# Patient Record
Sex: MALE | Race: WHITE | Employment: FULL TIME | URBAN - NONMETROPOLITAN AREA
[De-identification: names, ages, dates, MRNs, and addresses within clinical notes are randomized per-mention and may not be internally consistent; named-entity substitution may affect disease eponyms.]

---

## 2020-08-08 ENCOUNTER — HOSPITAL ENCOUNTER (OUTPATIENT)
Age: 45
Discharge: HOME | End: 2020-08-08
Payer: COMMERCIAL

## 2020-08-08 LAB
ALBUMIN SERPL-MCNC: 4.2 G/DL (ref 3.5–5.1)
ALP BLD-CCNC: 62 U/L (ref 38–126)
ALT SERPL-CCNC: 67 U/L (ref 11–66)
AST SERPL-CCNC: 73 U/L (ref 5–40)
BASOPHILS # BLD: 0.3 %
BASOPHILS ABSOLUTE: 0 THOU/MM3 (ref 0–0.1)
BILIRUB SERPL-MCNC: 0.6 MG/DL (ref 0.3–1.2)
BILIRUBIN DIRECT: < 0.2 MG/DL (ref 0–0.3)
CHOLESTEROL, TOTAL: 206 MG/DL (ref 100–199)
EOSINOPHIL # BLD: 2.7 %
EOSINOPHILS ABSOLUTE: 0.2 THOU/MM3 (ref 0–0.4)
ERYTHROCYTE [DISTWIDTH] IN BLOOD BY AUTOMATED COUNT: 13.4 % (ref 11.5–14.5)
ERYTHROCYTE [DISTWIDTH] IN BLOOD BY AUTOMATED COUNT: 43.9 FL (ref 35–45)
HCT VFR BLD CALC: 43.5 % (ref 42–52)
HDLC SERPL-MCNC: 40 MG/DL
HEMOGLOBIN: 14.8 GM/DL (ref 14–18)
IMMATURE GRANS (ABS): 0.02 THOU/MM3 (ref 0–0.07)
IMMATURE GRANULOCYTES: 0.3 %
LDL CHOLESTEROL CALCULATED: 139 MG/DL
LYMPHOCYTES # BLD: 30.1 %
LYMPHOCYTES ABSOLUTE: 2.1 THOU/MM3 (ref 1–4.8)
MCH RBC QN AUTO: 30.6 PG (ref 26–33)
MCHC RBC AUTO-ENTMCNC: 34 GM/DL (ref 32.2–35.5)
MCV RBC AUTO: 89.9 FL (ref 80–94)
MONOCYTES # BLD: 9.2 %
MONOCYTES ABSOLUTE: 0.6 THOU/MM3 (ref 0.4–1.3)
NUCLEATED RED BLOOD CELLS: 0 /100 WBC
PLATELET # BLD: 184 THOU/MM3 (ref 130–400)
PMV BLD AUTO: 12.3 FL (ref 9.4–12.4)
RBC # BLD: 4.84 MILL/MM3 (ref 4.7–6.1)
SEG NEUTROPHILS: 57.4 %
SEGMENTED NEUTROPHILS ABSOLUTE COUNT: 4 THOU/MM3 (ref 1.8–7.7)
T4 FREE: 1.05 NG/DL (ref 0.93–1.76)
TOTAL PROTEIN: 6.9 G/DL (ref 6.1–8)
TRIGL SERPL-MCNC: 137 MG/DL (ref 0–199)
TSH SERPL DL<=0.05 MIU/L-ACNC: 0.94 UIU/ML (ref 0.4–4.2)
WBC # BLD: 6.9 THOU/MM3 (ref 4.8–10.8)

## 2020-08-08 PROCEDURE — 36415 COLL VENOUS BLD VENIPUNCTURE: CPT

## 2020-08-08 PROCEDURE — 80076 HEPATIC FUNCTION PANEL: CPT

## 2020-08-08 PROCEDURE — 84439 ASSAY OF FREE THYROXINE: CPT

## 2020-08-08 PROCEDURE — 80061 LIPID PANEL: CPT

## 2020-08-08 PROCEDURE — 84443 ASSAY THYROID STIM HORMONE: CPT

## 2020-08-08 PROCEDURE — 85025 COMPLETE CBC W/AUTO DIFF WBC: CPT

## 2021-10-12 ENCOUNTER — HOSPITAL ENCOUNTER (OUTPATIENT)
Age: 46
Setting detail: SPECIMEN
Discharge: HOME | End: 2021-10-12

## 2021-10-12 PROCEDURE — U0005 INFEC AGEN DETEC AMPLI PROBE: HCPCS

## 2021-10-12 PROCEDURE — U0003 INFECTIOUS AGENT DETECTION BY NUCLEIC ACID (DNA OR RNA); SEVERE ACUTE RESPIRATORY SYNDROME CORONAVIRUS 2 (SARS-COV-2) (CORONAVIRUS DISEASE [COVID-19]), AMPLIFIED PROBE TECHNIQUE, MAKING USE OF HIGH THROUGHPUT TECHNOLOGIES AS DESCRIBED BY CMS-2020-01-R: HCPCS

## 2021-10-14 LAB
SARS-COV-2: DETECTED
SOURCE: ABNORMAL

## 2023-06-29 ENCOUNTER — OFFICE VISIT (OUTPATIENT)
Dept: FAMILY MEDICINE CLINIC | Age: 48
End: 2023-06-29
Payer: COMMERCIAL

## 2023-06-29 VITALS
WEIGHT: 281.2 LBS | TEMPERATURE: 98 F | SYSTOLIC BLOOD PRESSURE: 130 MMHG | HEIGHT: 70 IN | BODY MASS INDEX: 40.26 KG/M2 | HEART RATE: 90 BPM | DIASTOLIC BLOOD PRESSURE: 70 MMHG | RESPIRATION RATE: 18 BRPM | OXYGEN SATURATION: 97 %

## 2023-06-29 DIAGNOSIS — I10 PRIMARY HYPERTENSION: ICD-10-CM

## 2023-06-29 DIAGNOSIS — Z76.89 ENCOUNTER TO ESTABLISH CARE: Primary | ICD-10-CM

## 2023-06-29 DIAGNOSIS — Z13.220 SCREENING, LIPID: ICD-10-CM

## 2023-06-29 DIAGNOSIS — K21.9 GASTROESOPHAGEAL REFLUX DISEASE WITHOUT ESOPHAGITIS: ICD-10-CM

## 2023-06-29 DIAGNOSIS — R74.8 LIVER ENZYME ELEVATION: ICD-10-CM

## 2023-06-29 DIAGNOSIS — Z00.00 HEALTHCARE MAINTENANCE: ICD-10-CM

## 2023-06-29 DIAGNOSIS — Z13.1 SCREENING FOR DIABETES MELLITUS (DM): ICD-10-CM

## 2023-06-29 PROCEDURE — 99203 OFFICE O/P NEW LOW 30 MIN: CPT | Performed by: FAMILY MEDICINE

## 2023-06-29 PROCEDURE — 3078F DIAST BP <80 MM HG: CPT | Performed by: FAMILY MEDICINE

## 2023-06-29 PROCEDURE — 3075F SYST BP GE 130 - 139MM HG: CPT | Performed by: FAMILY MEDICINE

## 2023-06-29 RX ORDER — LOSARTAN POTASSIUM 25 MG/1
TABLET ORAL DAILY
COMMUNITY
End: 2023-06-29 | Stop reason: SDUPTHER

## 2023-06-29 RX ORDER — LOSARTAN POTASSIUM 25 MG/1
25 TABLET ORAL DAILY
Qty: 90 TABLET | Refills: 3 | Status: SHIPPED | OUTPATIENT
Start: 2023-06-29

## 2023-06-29 SDOH — ECONOMIC STABILITY: HOUSING INSECURITY
IN THE LAST 12 MONTHS, WAS THERE A TIME WHEN YOU DID NOT HAVE A STEADY PLACE TO SLEEP OR SLEPT IN A SHELTER (INCLUDING NOW)?: NO

## 2023-06-29 SDOH — ECONOMIC STABILITY: INCOME INSECURITY: HOW HARD IS IT FOR YOU TO PAY FOR THE VERY BASICS LIKE FOOD, HOUSING, MEDICAL CARE, AND HEATING?: NOT HARD AT ALL

## 2023-06-29 SDOH — ECONOMIC STABILITY: FOOD INSECURITY: WITHIN THE PAST 12 MONTHS, YOU WORRIED THAT YOUR FOOD WOULD RUN OUT BEFORE YOU GOT MONEY TO BUY MORE.: NEVER TRUE

## 2023-06-29 SDOH — ECONOMIC STABILITY: FOOD INSECURITY: WITHIN THE PAST 12 MONTHS, THE FOOD YOU BOUGHT JUST DIDN'T LAST AND YOU DIDN'T HAVE MONEY TO GET MORE.: NEVER TRUE

## 2023-06-29 ASSESSMENT — ENCOUNTER SYMPTOMS
NAUSEA: 1
CONSTIPATION: 0
VOMITING: 0
DIARRHEA: 0
BLOOD IN STOOL: 0
SHORTNESS OF BREATH: 0
COUGH: 1

## 2023-06-29 ASSESSMENT — PATIENT HEALTH QUESTIONNAIRE - PHQ9
SUM OF ALL RESPONSES TO PHQ QUESTIONS 1-9: 0
SUM OF ALL RESPONSES TO PHQ9 QUESTIONS 1 & 2: 0
SUM OF ALL RESPONSES TO PHQ QUESTIONS 1-9: 0
1. LITTLE INTEREST OR PLEASURE IN DOING THINGS: 0
SUM OF ALL RESPONSES TO PHQ QUESTIONS 1-9: 0
2. FEELING DOWN, DEPRESSED OR HOPELESS: 0
SUM OF ALL RESPONSES TO PHQ QUESTIONS 1-9: 0

## 2023-07-31 ENCOUNTER — TELEPHONE (OUTPATIENT)
Dept: FAMILY MEDICINE CLINIC | Age: 48
End: 2023-07-31

## 2023-07-31 NOTE — TELEPHONE ENCOUNTER
Records were made available from the previous practice. -- Colonoscopy is noted to be done December/20 8/2020 with spasms noted along with mild internal hemorrhoids and diverticulosis. Please locate in records and add to health maintenance. -- please remind patient to obtain labs ordered. His liver enzymes were noted to be elevated in 2020 and follow up is advised. -- appendectomy with ileocectomy added to surgical history.

## 2023-08-04 NOTE — TELEPHONE ENCOUNTER
Patient stopped into office to follow up on letter. He states colonoscopy was done with Dr Mirtha Gonzales at [facility].

## 2023-09-11 ENCOUNTER — HOSPITAL ENCOUNTER (OUTPATIENT)
Age: 48
Discharge: HOME | End: 2023-09-11
Payer: COMMERCIAL

## 2023-09-11 DIAGNOSIS — Z13.1 SCREENING FOR DIABETES MELLITUS (DM): ICD-10-CM

## 2023-09-11 DIAGNOSIS — R74.8 LIVER ENZYME ELEVATION: ICD-10-CM

## 2023-09-11 DIAGNOSIS — I10 PRIMARY HYPERTENSION: ICD-10-CM

## 2023-09-11 DIAGNOSIS — Z13.220 SCREENING, LIPID: ICD-10-CM

## 2023-09-11 DIAGNOSIS — Z00.00 HEALTHCARE MAINTENANCE: ICD-10-CM

## 2023-09-11 LAB
BASOPHILS ABSOLUTE: 0 THOU/MM3 (ref 0–0.1)
BASOPHILS NFR BLD AUTO: 0.5 %
DEPRECATED RDW RBC AUTO: 40.9 FL (ref 35–45)
EOSINOPHIL NFR BLD AUTO: 2.4 %
EOSINOPHILS ABSOLUTE: 0.2 THOU/MM3 (ref 0–0.4)
ERYTHROCYTE [DISTWIDTH] IN BLOOD BY AUTOMATED COUNT: 13.1 % (ref 11.5–14.5)
HCT VFR BLD AUTO: 45 % (ref 42–52)
HGB BLD-MCNC: 14.8 GM/DL (ref 14–18)
IMM GRANULOCYTES # BLD AUTO: 0.03 THOU/MM3 (ref 0–0.07)
IMM GRANULOCYTES NFR BLD AUTO: 0.3 %
LYMPHOCYTES ABSOLUTE: 3 THOU/MM3 (ref 1–4.8)
LYMPHOCYTES NFR BLD AUTO: 34.4 %
MCH RBC QN AUTO: 28.4 PG (ref 26–33)
MCHC RBC AUTO-ENTMCNC: 32.9 GM/DL (ref 32.2–35.5)
MCV RBC AUTO: 86.2 FL (ref 80–94)
MONOCYTES ABSOLUTE: 0.8 THOU/MM3 (ref 0.4–1.3)
MONOCYTES NFR BLD AUTO: 9.1 %
NEUTROPHILS NFR BLD AUTO: 53.3 %
NRBC BLD AUTO-RTO: 0 /100 WBC
PLATELET # BLD AUTO: 190 THOU/MM3 (ref 130–400)
PMV BLD AUTO: 11.8 FL (ref 9.4–12.4)
RBC # BLD AUTO: 5.22 MILL/MM3 (ref 4.7–6.1)
SEGMENTED NEUTROPHILS ABSOLUTE COUNT: 4.6 THOU/MM3 (ref 1.8–7.7)
WBC # BLD AUTO: 8.7 THOU/MM3 (ref 4.8–10.8)

## 2023-09-11 PROCEDURE — 85025 COMPLETE CBC W/AUTO DIFF WBC: CPT

## 2023-09-11 PROCEDURE — 83036 HEMOGLOBIN GLYCOSYLATED A1C: CPT

## 2023-09-11 PROCEDURE — 80053 COMPREHEN METABOLIC PANEL: CPT

## 2023-09-11 PROCEDURE — 36415 COLL VENOUS BLD VENIPUNCTURE: CPT

## 2023-09-11 PROCEDURE — 80061 LIPID PANEL: CPT

## 2023-09-12 LAB
ALBUMIN SERPL BCG-MCNC: 4.1 G/DL (ref 3.5–5.1)
ALP SERPL-CCNC: 82 U/L (ref 38–126)
ALT SERPL W/O P-5'-P-CCNC: 63 U/L (ref 11–66)
ANION GAP SERPL CALC-SCNC: 14 MEQ/L (ref 8–16)
AST SERPL-CCNC: 41 U/L (ref 5–40)
BILIRUB SERPL-MCNC: 0.4 MG/DL (ref 0.3–1.2)
BUN SERPL-MCNC: 10 MG/DL (ref 7–22)
CALCIUM SERPL-MCNC: 9.3 MG/DL (ref 8.5–10.5)
CHLORIDE SERPL-SCNC: 102 MEQ/L (ref 98–111)
CHOLEST SERPL-MCNC: 185 MG/DL (ref 100–199)
CO2 SERPL-SCNC: 24 MEQ/L (ref 23–33)
CREAT SERPL-MCNC: 0.7 MG/DL (ref 0.4–1.2)
DEPRECATED MEAN GLUCOSE BLD GHB EST-ACNC: 210 MG/DL (ref 70–126)
GFR SERPL CREATININE-BSD FRML MDRD: > 60 ML/MIN/1.73M2
GLUCOSE SERPL-MCNC: 214 MG/DL (ref 70–108)
HBA1C MFR BLD HPLC: 9 % (ref 4.4–6.4)
HDLC SERPL-MCNC: 37 MG/DL
LDLC SERPL CALC-MCNC: 114 MG/DL
POTASSIUM SERPL-SCNC: 3.2 MEQ/L (ref 3.5–5.2)
PROT SERPL-MCNC: 6.6 G/DL (ref 6.1–8)
SODIUM SERPL-SCNC: 140 MEQ/L (ref 135–145)
TRIGL SERPL-MCNC: 172 MG/DL (ref 0–199)

## 2023-09-13 ENCOUNTER — TELEPHONE (OUTPATIENT)
Dept: FAMILY MEDICINE CLINIC | Age: 48
End: 2023-09-13

## 2023-09-13 NOTE — TELEPHONE ENCOUNTER
----- Message from Cristobal Drummond MD sent at 9/13/2023 10:27 AM EDT -----  Please let patient know that his labs show diabetes. We'll discuss in detail at upcoming visit. In the meantime, I want him to jot down what he drinks and eats and how much. He may use [employer] hilary to keep track instead. We'll discuss management of such to improve his health and reduce complications from such. Liver enzymes were borderline elevated. Potassium was a bit low -- encourage more vegetables and fruit.

## 2023-09-13 NOTE — TELEPHONE ENCOUNTER
Patient has returned call for results. I have reviewed results with him and he has verbalized an understanding. He was also notified to keep track of what he has been eating and drinking for upcoming visit.

## 2023-09-22 ENCOUNTER — OFFICE VISIT (OUTPATIENT)
Dept: FAMILY MEDICINE CLINIC | Age: 48
End: 2023-09-22
Payer: COMMERCIAL

## 2023-09-22 VITALS
DIASTOLIC BLOOD PRESSURE: 100 MMHG | WEIGHT: 282.4 LBS | HEIGHT: 70 IN | RESPIRATION RATE: 16 BRPM | TEMPERATURE: 98.5 F | HEART RATE: 77 BPM | SYSTOLIC BLOOD PRESSURE: 158 MMHG | OXYGEN SATURATION: 97 % | BODY MASS INDEX: 40.43 KG/M2

## 2023-09-22 DIAGNOSIS — I10 PRIMARY HYPERTENSION: ICD-10-CM

## 2023-09-22 DIAGNOSIS — K21.9 GASTROESOPHAGEAL REFLUX DISEASE WITHOUT ESOPHAGITIS: ICD-10-CM

## 2023-09-22 DIAGNOSIS — E78.6 LOW HDL (UNDER 40): ICD-10-CM

## 2023-09-22 DIAGNOSIS — E87.6 HYPOKALEMIA: ICD-10-CM

## 2023-09-22 DIAGNOSIS — E11.65 UNCONTROLLED TYPE 2 DIABETES MELLITUS WITH HYPERGLYCEMIA (HCC): Primary | ICD-10-CM

## 2023-09-22 DIAGNOSIS — E11.9 NEWLY DIAGNOSED DIABETES (HCC): ICD-10-CM

## 2023-09-22 PROCEDURE — 99215 OFFICE O/P EST HI 40 MIN: CPT | Performed by: FAMILY MEDICINE

## 2023-09-22 PROCEDURE — 3077F SYST BP >= 140 MM HG: CPT | Performed by: FAMILY MEDICINE

## 2023-09-22 PROCEDURE — 3052F HG A1C>EQUAL 8.0%<EQUAL 9.0%: CPT | Performed by: FAMILY MEDICINE

## 2023-09-22 PROCEDURE — 3080F DIAST BP >= 90 MM HG: CPT | Performed by: FAMILY MEDICINE

## 2023-09-22 RX ORDER — GLUCOSAMINE HCL/CHONDROITIN SU 500-400 MG
CAPSULE ORAL
Qty: 100 STRIP | Refills: 3 | Status: SHIPPED | OUTPATIENT
Start: 2023-09-22

## 2023-09-22 RX ORDER — LOSARTAN POTASSIUM 50 MG/1
50 TABLET ORAL DAILY
Qty: 90 TABLET | Refills: 3 | Status: SHIPPED | OUTPATIENT
Start: 2023-09-22 | End: 2024-09-21

## 2023-09-22 RX ORDER — LANCETS 30 GAUGE
1 EACH MISCELLANEOUS DAILY
Qty: 300 EACH | Refills: 1 | Status: SHIPPED | OUTPATIENT
Start: 2023-09-22

## 2023-09-22 RX ORDER — BLOOD-GLUCOSE METER
1 KIT MISCELLANEOUS DAILY
Qty: 1 KIT | Refills: 0 | Status: SHIPPED | OUTPATIENT
Start: 2023-09-22

## 2023-09-22 RX ORDER — METFORMIN HYDROCHLORIDE 500 MG/1
500 TABLET, EXTENDED RELEASE ORAL 2 TIMES DAILY
Qty: 180 TABLET | Refills: 3 | Status: SHIPPED | OUTPATIENT
Start: 2023-09-22 | End: 2024-09-21

## 2023-09-22 ASSESSMENT — ENCOUNTER SYMPTOMS: SHORTNESS OF BREATH: 0

## 2023-09-22 NOTE — PATIENT INSTRUCTIONS
Staff -- please repeat blood pressure for this patient before patient leaves the office. If blood pressure not <140/90, then please arrange follow up in 1 month with nurse visit. Thank you    I encourage you to exercise. 150 minutes of exercise per week. -- 30 minutes 3 times a day with aerobic type activities (get your heart pumping for 20 minutes or so per session)   -- other with stretching, strengthening or other activity         Reduce then rid of pop except for occassional treat    Recommend substitute for sweetened beverages including pop, sweet tea, juices:   -- Continue good water intake   -- adding stevia to tea or coffee  -- Zevia brand pop (stevia will not increase your insulin)   -- Stevia flavoring to soda water (eg. Root beer flavoring)  -- Add 2-3 mint oil drops or 2-3 lemon oil drops to 10-12 oz of water.          Copied from [email or web address]  On 6/7/7786

## 2023-09-22 NOTE — PROGRESS NOTES
found for: \"LABVLDL\", \"VLDL\"  No results found for: \"CHOLHDLRATIO\"    No results found for: \"MALBCR\"    Lab Results   Component Value Date    TSH 0.943 08/08/2020      No results found for: \"XOHLQPYU27\"   No results found for: \"MG\"   Lab Results   Component Value Date    ALT 63 09/11/2023    AST 41 (H) 09/11/2023    ALKPHOS 82 09/11/2023    BILITOT 0.4 09/11/2023    BILIDIR <0.2 08/08/2020        Lab Results   Component Value Date    WBC 8.7 09/11/2023    HGB 14.8 09/11/2023    HCT 45.0 09/11/2023    MCV 86.2 09/11/2023     09/11/2023     I have spent 40 minutes reviewing previous notes, test results and face to face with the patient discussing the diagnosis and importance of compliance with the treatment plan as well as documenting on the day of the visit. This office note may have been at least partially dictated. Effort was made to review for errors but some may have been missed. Please contact Clara Dodd of note for clarification if needed. An electronic signature was used to authenticate this note.     --Chelly Claros MD

## 2023-09-28 ENCOUNTER — TELEPHONE (OUTPATIENT)
Dept: FAMILY MEDICINE CLINIC | Age: 48
End: 2023-09-28

## 2023-09-28 DIAGNOSIS — K21.9 GASTROESOPHAGEAL REFLUX DISEASE WITHOUT ESOPHAGITIS: ICD-10-CM

## 2023-09-28 NOTE — TELEPHONE ENCOUNTER
Called pt per Dr. Hudson Para req. Pt asked for Rx of Esomeprazole Mag Cap 20mg. This Rx is not covered by his insurance. Wants to discuss switching to pantoprazole or omeprazole that is. If not they will have to do a Prior Auth.  No answer, left a message to return call to office

## 2023-09-29 NOTE — TELEPHONE ENCOUNTER
Called pt again, spoke to him OTP. Pt states he has been using an OTC version he bought from [employer] since his last visit that has been helping. He no longer wants the RX or any alternatives.

## 2023-11-21 ENCOUNTER — OFFICE VISIT (OUTPATIENT)
Dept: INTERNAL MEDICINE CLINIC | Age: 48
End: 2023-11-21

## 2023-11-21 VITALS — BODY MASS INDEX: 40.09 KG/M2 | WEIGHT: 280 LBS | HEIGHT: 70 IN

## 2023-11-21 DIAGNOSIS — E11.9 NEWLY DIAGNOSED DIABETES (HCC): Primary | ICD-10-CM

## 2023-11-21 PROCEDURE — NBSRV NON-BILLABLE SERVICE: Performed by: DIETITIAN, REGISTERED

## 2023-11-21 NOTE — PROGRESS NOTES
[facility]  [address] [facility] [place] [facility], [address] [place] [address]  [phone] (phone)  [phone] (fax)    Patient Name: Jaylen Hsieh. Date of Birth: [birth year]. MRN: [id number]      Assessment: Patient is a 51 y.o. male seen for Initial MNT visit for Type 2 DB.     -Nutritionally relevant labs:   Lab Results   Component Value Date/Time    LABA1C 9.0 (H) 09/11/2023 02:01 PM    GLUCOSE 214 (H) 09/11/2023 02:01 PM    CHOL 185 09/11/2023 02:01 PM    HDL 37 09/11/2023 02:01 PM    LDLCALC 114 09/11/2023 02:01 PM    TRIG 172 09/11/2023 02:01 PM     New Dx of diabetes. -Blood sugar trends: not checking BS. Nights FT - 11 pm - 7 am @ [employer]. Sunday night to Thursday. 40 minutes commute to work. HH - lives alone. Use to be under 220# before 2020 Covid by working [facility] at [facility] [employer] in [address] [place] [address] recall:   When home from work  - Dry cereal - cheerios plain or frosted mini wheat no milk OR 2 sl white toast with butter and occ jam OR Brkf hot pocket - 2 pack. Grape juice over 1 cup. Drinks for the antioxidants. Goes to bed 830 am  Up 11 - 3 pm.  4 pm - yesterday - Dinner  - from frozen chicken nuggets - 10, pop - mountain dew 20 oz bottle. Goes back to sleep 530 - 730 pm  Fig rothman or dry cereal.  No packing for work. Water at work and not a big pop drinker. Meals that he makes for himself - HB helper, spaghetti, Velveeta mac & cheese, Dinty figueredo beef stew. Eats canned chicken noodle soup or vegetable beef when on sale. Eating out - Once a month eating out.    -Main Beverages: 2 bottles of water/day at work. 1-2 bottles of mountain dew/day. No coffee. Does not drink milk. Over 1 cup grape juice in the morning. Denies alcohol. Exercising - [employer] for 1 hour prior to covid everyday. Not Sunday. Currently not exercising but only sporadically.     -Impression of Dietary Intake: on average, 2 meals per day, high fat/ cholesterol, high salt, lacking

## 2023-11-21 NOTE — PATIENT INSTRUCTIONS
Call  Dr Kecia Gutierrez office if suppose to take 2 tablets metformin Twice a day or if you should stay at 1 tablet twice a day. - also ask if your glucometer was ordered. 1.)  Get familiar with reading the nutrition facts label by looking at serving size and total carbohydrates. - Without a label refer to your carb count guide booklet. 2.)  Measure foods for accuracy in carb counting.    3.)  Healthy carb choices:  whole grains, whole wheat pasta, starchy vegetables, fresh fruit and lowfat/non-fat milk and yogurt. 4.)  Your meal plan is found on the inside cover of your carb counting guide booklet:  1st meal:  45 gms carbohydrates + 1-2 oz protein  2nd meal: 45 gms carbohydrates + 2-3 oz protein + non-starchy veggies  3rd catherine[de-identified]  45 gms carbohydrates + 2-3 oz protein + non- starchy veggies    Snack time:  15 - 20 gms carbohydrates + 1 oz protein x1-2 snacks/day    5.)  Choose lean protein most of the time and Cut in 1/2 added fats to help with weight loss efforts. 6.) Bring a 1-2 week food log and meter to next dietitian appt. Thanks. Check BS:  1-2x/day at various times of the day.   Fasting BS (1st thing in the morning) or before a meal (at least 4 hour since last ate), BS goal:  90 - 130  2 hours after the start of a meal, BS goal:  100 - 150     7.) Good idea that you want to get back to the [employer] to exercise!!

## 2023-12-12 ENCOUNTER — NURSE ONLY (OUTPATIENT)
Dept: INTERNAL MEDICINE CLINIC | Age: 48
End: 2023-12-12
Payer: COMMERCIAL

## 2023-12-12 DIAGNOSIS — E11.9 TYPE 2 DIABETES MELLITUS WITHOUT COMPLICATION, WITHOUT LONG-TERM CURRENT USE OF INSULIN (HCC): Primary | ICD-10-CM

## 2023-12-12 PROCEDURE — G0109 DIAB MANAGE TRN IND/GROUP: HCPCS | Performed by: REGISTERED NURSE

## 2023-12-12 PROCEDURE — NBSRV NON-BILLABLE SERVICE: Performed by: REGISTERED NURSE

## 2023-12-12 NOTE — PROGRESS NOTES
Patient presented for the [facility]  education class. The content was presented via PowerPoint, lecture and case-based format covering the following concepts: 60mins education. What is Diabetes?   Define glycosolation  Interpretation of the A1C and goal.  Pancreatic function  Target organs and macro and microvascular complications  Goals for SGM BP goals  Meal clearance   S&S hyper/hypoglycemia and tx   Insulin physiology-basal/bolus  Navigating sick days stress  Relationship between diet, exercise, meds and stress   Utilizing darian care system at appropriate time  Assuming responsibility in self management  Troubleshooting patterns     Post test score 13 out of 16

## 2024-01-05 ENCOUNTER — OFFICE VISIT (OUTPATIENT)
Dept: FAMILY MEDICINE CLINIC | Age: 49
End: 2024-01-05
Payer: COMMERCIAL

## 2024-01-05 VITALS
RESPIRATION RATE: 16 BRPM | HEIGHT: 70 IN | BODY MASS INDEX: 40.03 KG/M2 | HEART RATE: 81 BPM | SYSTOLIC BLOOD PRESSURE: 144 MMHG | WEIGHT: 279.6 LBS | OXYGEN SATURATION: 97 % | DIASTOLIC BLOOD PRESSURE: 90 MMHG | TEMPERATURE: 98.4 F

## 2024-01-05 DIAGNOSIS — E87.6 HYPOKALEMIA: ICD-10-CM

## 2024-01-05 DIAGNOSIS — E11.65 UNCONTROLLED TYPE 2 DIABETES MELLITUS WITH HYPERGLYCEMIA (HCC): Primary | ICD-10-CM

## 2024-01-05 DIAGNOSIS — Z11.59 NEED FOR HEPATITIS C SCREENING TEST: ICD-10-CM

## 2024-01-05 DIAGNOSIS — I10 PRIMARY HYPERTENSION: ICD-10-CM

## 2024-01-05 LAB — HBA1C MFR BLD: 7.9 %

## 2024-01-05 PROCEDURE — 83036 HEMOGLOBIN GLYCOSYLATED A1C: CPT | Performed by: FAMILY MEDICINE

## 2024-01-05 PROCEDURE — 3051F HG A1C>EQUAL 7.0%<8.0%: CPT | Performed by: FAMILY MEDICINE

## 2024-01-05 PROCEDURE — 3080F DIAST BP >= 90 MM HG: CPT | Performed by: FAMILY MEDICINE

## 2024-01-05 PROCEDURE — 99214 OFFICE O/P EST MOD 30 MIN: CPT | Performed by: FAMILY MEDICINE

## 2024-01-05 PROCEDURE — 3077F SYST BP >= 140 MM HG: CPT | Performed by: FAMILY MEDICINE

## 2024-01-05 RX ORDER — LOSARTAN POTASSIUM 100 MG/1
100 TABLET ORAL DAILY
Qty: 90 TABLET | Refills: 3 | Status: SHIPPED | OUTPATIENT
Start: 2024-01-05 | End: 2025-01-04

## 2024-01-05 RX ORDER — SITAGLIPTIN AND METFORMIN HYDROCHLORIDE 500; 50 MG/1; MG/1
1 TABLET, FILM COATED ORAL 2 TIMES DAILY WITH MEALS
Qty: 180 TABLET | Refills: 1 | Status: SHIPPED | OUTPATIENT
Start: 2024-01-05

## 2024-01-05 ASSESSMENT — PATIENT HEALTH QUESTIONNAIRE - PHQ9
SUM OF ALL RESPONSES TO PHQ QUESTIONS 1-9: 0
1. LITTLE INTEREST OR PLEASURE IN DOING THINGS: 0
2. FEELING DOWN, DEPRESSED OR HOPELESS: 0
SUM OF ALL RESPONSES TO PHQ9 QUESTIONS 1 & 2: 0
SUM OF ALL RESPONSES TO PHQ QUESTIONS 1-9: 0

## 2024-01-05 ASSESSMENT — ENCOUNTER SYMPTOMS: SHORTNESS OF BREATH: 0

## 2024-01-05 NOTE — PROGRESS NOTES
Gerardo Whitman (:  1975) is a 48 y.o. male,Established patient, here for evaluation of the following chief complaint(s):  Hypertension (F/U for HTN. Doesn't monitor B/P at home regularly ) and Gastroesophageal Reflux       ASSESSMENT/PLAN:  1. Uncontrolled type 2 diabetes mellitus with hyperglycemia (HCC)  -     POCT glycosylated hemoglobin (Hb A1C)  -     Basic Metabolic Panel; Future  -     Hemoglobin A1C; Future  -     Microalbumin / Creatinine Urine Ratio; Future  -      DIABETES FOOT EXAM  -     sitaGLIPtan-metFORMIN (JANUMET)  MG per tablet; Take 1 tablet by mouth 2 times daily (with meals), Disp-180 tablet, R-1Normal  2. Primary hypertension  -     losartan (COZAAR) 100 MG tablet; Take 1 tablet by mouth daily, Disp-90 tablet, R-3Normal  -     Basic Metabolic Panel; Future  -     Hemoglobin A1C; Future  -      DIABETES FOOT EXAM  3. Hypokalemia  -     Basic Metabolic Panel; Future  -     Hemoglobin A1C; Future  -      DIABETES FOOT EXAM  4. Need for hepatitis C screening test  -     Hepatitis C Antibody; Future    Will do another short term follow up.  Will increase medication since higher metformin not ideal.  BP better but not to goal. Checking outside of clinic would help.    Check out [employer] or Home  meals.     Return in about 4 months (around 2024).         Subjective   SUBJECTIVE/OBJECTIVE:  HPI    Dm ii -- today's A1c shows improvement.  He is taking metformin but having some gut trouble taking two pills consistently.   HTN not well controlled  Low potassium follow up needed.   Lives by himself with a dog. Will walk the dog. 8 hours of sleep normally; denies trouble sleeping.   BMI 40.  Diet is not great -- trying to choose better, low sodium soup.  Doesn't cook much.   Activity at work.     Wt Readings from Last 3 Encounters:   24 126.8 kg (279 lb 9.6 oz)   23 127 kg (280 lb)   23 128.1 kg (282 lb 6.4 oz)       BP Readings from Last 10

## 2024-01-05 NOTE — PATIENT INSTRUCTIONS
Ready to warm up and eat healthier \"real food\" meal ideas:    Check out [employer]    [email or web address]/    Home  meals, online subscription  -- expressed meal kits  -- oven ready  -- fast and fresh    [email or web address]

## 2024-01-24 ENCOUNTER — OFFICE VISIT (OUTPATIENT)
Dept: INTERNAL MEDICINE CLINIC | Age: 49
End: 2024-01-24

## 2024-01-24 VITALS
SYSTOLIC BLOOD PRESSURE: 142 MMHG | HEIGHT: 70 IN | WEIGHT: 278 LBS | TEMPERATURE: 98.3 F | HEART RATE: 78 BPM | DIASTOLIC BLOOD PRESSURE: 88 MMHG | BODY MASS INDEX: 39.8 KG/M2

## 2024-01-24 VITALS — BODY MASS INDEX: 39.8 KG/M2 | WEIGHT: 278 LBS | HEIGHT: 70 IN

## 2024-01-24 DIAGNOSIS — E11.9 TYPE 2 DIABETES MELLITUS WITHOUT COMPLICATION, WITHOUT LONG-TERM CURRENT USE OF INSULIN (HCC): ICD-10-CM

## 2024-01-24 DIAGNOSIS — E11.9 TYPE 2 DIABETES MELLITUS WITHOUT COMPLICATION, WITHOUT LONG-TERM CURRENT USE OF INSULIN (HCC): Primary | ICD-10-CM

## 2024-01-24 DIAGNOSIS — E11.9 CONTROLLED TYPE 2 DIABETES MELLITUS WITHOUT COMPLICATION, WITHOUT LONG-TERM CURRENT USE OF INSULIN (HCC): Primary | ICD-10-CM

## 2024-01-24 PROCEDURE — NBSRV NON-BILLABLE SERVICE: Performed by: DIETITIAN, REGISTERED

## 2024-01-24 PROCEDURE — NBSRV NON-BILLABLE SERVICE: Performed by: REGISTERED NURSE

## 2024-01-24 NOTE — PATIENT INSTRUCTIONS
Need to prevent Carbohydrate excursions.  Example:  Eat 3 meals/day - include a meal at work - it can be a mini-meal and quick and easy.  - Carb budget- 60 - 75 gms carbs/meal  - Add protein  - Add Vegetables.    You have a good idea about going to the fitness center prior to going home from work.    Limit to 1/2 cup juice and then add water and ice and drink in this way.  - Tall glasses of juice is high in natural sugars and can spike your BS's high.    Bump up water intake! Aim for 2-3 bottles of water/day.    Bring a 1 week food log to next dietitian appt again - Thanks.

## 2024-01-24 NOTE — PROGRESS NOTES
[facility]  [address] [place] [address]  [phone] (phone)  [phone] (fax)    Patient ID: Gerardo Whitman 1975  Referring Provider: Dr. Goetz     Diabetes Mellitus Type 2, Initial Visit: Patient here for an initial evaluation of Type 2 diabetes mellitus. Last c-peptide was unknown.  Current symptoms/problems include none . .    The patient was initially diagnosed with Type 2 diabetes mellitus since 9/2023.    Known diabetic complications: none  Cardiovascular risk factors: diabetes mellitus, dyslipidemia, hypertension, male gender, and obesity (BMI >= 30 kg/m2)  Family history of diabetes: none known  Weight trend: has gained over 20 pounds in the past year    Current Diabetes Pharmacotherapy:  Janumet 50-500mg 2 times daily. Gerardo does report diarrhea every 0-2 days that he states is                      manageable.    Glucose Trends:   Glucose at 6 hrs PPD today resulted at 132mg/dl  Current monitoring regimen: not testing  Home blood sugar trends:    -Fasting AM:    -Before lunch:    -Before dinner:    -Bedtime:  Any episodes of hypoglycemia? no   -Treats with na    Lifestyle Factors:   Previous visit with dietician: yes - 1/24/2024  Current diet: reviewed with dietician today  Current exercise: ADL's --has a gym at work --can stop after work      Health Maintenance:  Eye exam current (within one year): no Date: 2020, [facility]  Any history of foot problems? no  Last foot exam: yes Date: 1/5/2024, PCP  Immunizations up to date:    Pneumonia: no; declined   Influenza: no  The 10-year ASCVD risk score (Eric CHAVEZ, et al., 2019) is: 10.2%    Values used to calculate the score:      Age: 48 years      Sex: Male      Is Non- : No      Diabetic: Yes      Tobacco smoker: No      Systolic Blood Pressure: 142 mmHg      Is BP treated: Yes      HDL Cholesterol: 37 mg/dL      Total Cholesterol: 185 mg/dL   Last panel - LDL:   Lab Results   Component Value Date

## 2024-01-24 NOTE — PROGRESS NOTES
[facility]  [facility]  [address] [place] [address]  [phone] (phone)  [phone] (fax)    Patient Name: Gerardo Whitman. Date of Birth: [birth year]. MRN: [id number]      Assessment: Patient is a 48 y.o. male seen for follow-up MNT visit for Type 2 DB.     -Nutritionally relevant labs:   Lab Results   Component Value Date/Time    LABA1C 7.9 01/05/2024 02:21 PM    LABA1C 9.0 (H) 09/11/2023 02:01 PM    GLUCOSE 214 (H) 09/11/2023 02:01 PM    CHOL 185 09/11/2023 02:01 PM    HDL 37 09/11/2023 02:01 PM    LDLCALC 114 09/11/2023 02:01 PM    TRIG 172 09/11/2023 02:01 PM     DB meds:  Janumet  mg tablet daily  -Blood sugar trends: Currently not checking BS.  In office . FBS 6 1/2 since last meal.    Pt works Nights FT - 11 pm - 7 am @ [employer].  Sunday night to Thursday. 40 minutes commute to work.  HH - lives alone.  Use to be under 220# before 2020 Covid by working out at [facility] [employer] in [place].    Woke up 1230 pm today  -Food recall/food log:  Breakfast: when home from work 8- 830 am am Today had Dry Plain cheerios, no milk. Does not keep milk in the house since does not use enough.  When up from sleeping:  ~ 4 pm - Dinty Valentine Beef Stew OR 1 can of chicken noodle soup OR 20 Frank chicken nuggets OR 20 Pizza rolls OR grilled cheese sandwich OR 4 corndogs OR 3 hot dogs.  Snacks: Pt denies eating snackshas apples at home but has not eaten.     Skip eating at work most of the time - a lot of times does not have time to eat at work.  Work provides - Vending machines and market fresh sandwich are available.  Occ he may eat pbutter cracker pkg OR fig newtons.    -Main Beverages: water at work. 2 bottles of Mountain Dew/day.  Large cup of juice everyday with his brkf meal. Denies Alcohol.    -Impression of Dietary Intake: on average, 2 meals per day, low fiber, high fat/ cholesterol, high salt, lacking routine intake of fruits and

## 2024-01-24 NOTE — PATIENT INSTRUCTIONS
Make an appointment to get your eyes checked  Start checking your blood sugars once daily      Either waking up (fasting)  --goal is             Or 2 hours after breakfast --goal is 100-150/160            Or before bedtime meal   --goal is   Set a goal to stop all regular pop    --try zero soda or diet soda  or try Crystal Light      Water is the best!!!  Start going to the gym after work --start tomorrow morning       -start slower-avoid getting injured

## 2024-01-25 DIAGNOSIS — E11.65 UNCONTROLLED TYPE 2 DIABETES MELLITUS WITH HYPERGLYCEMIA (HCC): ICD-10-CM

## 2024-01-25 NOTE — TELEPHONE ENCOUNTER
Patient was in for an appointment in our clinic.  When he was in, he stated that he has never received his blood sugar meter.  I had called the [facility] and got a recording that there is a new pharmacy to send the scripts to. Please sign new scripts for his blood sugar meter, strips and lancets to go to the correct pharmacy. Thank you

## 2024-01-29 RX ORDER — LANCETS 30 GAUGE
1 EACH MISCELLANEOUS DAILY
Qty: 300 EACH | Refills: 1 | Status: SHIPPED | OUTPATIENT
Start: 2024-01-29

## 2024-01-29 RX ORDER — BLOOD-GLUCOSE METER
1 KIT MISCELLANEOUS DAILY
Qty: 1 KIT | Refills: 0 | Status: SHIPPED | OUTPATIENT
Start: 2024-01-29

## 2024-01-29 RX ORDER — GLUCOSAMINE HCL/CHONDROITIN SU 500-400 MG
CAPSULE ORAL
Qty: 100 STRIP | Refills: 3 | Status: SHIPPED | OUTPATIENT
Start: 2024-01-29

## 2024-04-29 ENCOUNTER — HOSPITAL ENCOUNTER (OUTPATIENT)
Age: 49
Discharge: HOME | End: 2024-04-29
Payer: COMMERCIAL

## 2024-04-29 DIAGNOSIS — E87.6 HYPOKALEMIA: ICD-10-CM

## 2024-04-29 DIAGNOSIS — I10 PRIMARY HYPERTENSION: ICD-10-CM

## 2024-04-29 DIAGNOSIS — E11.65 UNCONTROLLED TYPE 2 DIABETES MELLITUS WITH HYPERGLYCEMIA (HCC): ICD-10-CM

## 2024-04-29 DIAGNOSIS — Z11.59 NEED FOR HEPATITIS C SCREENING TEST: ICD-10-CM

## 2024-04-29 PROCEDURE — 86803 HEPATITIS C AB TEST: CPT

## 2024-04-29 PROCEDURE — 82043 UR ALBUMIN QUANTITATIVE: CPT

## 2024-04-29 PROCEDURE — 80048 BASIC METABOLIC PNL TOTAL CA: CPT

## 2024-04-29 PROCEDURE — 36415 COLL VENOUS BLD VENIPUNCTURE: CPT

## 2024-04-29 PROCEDURE — 83036 HEMOGLOBIN GLYCOSYLATED A1C: CPT

## 2024-04-30 LAB
ANION GAP SERPL CALC-SCNC: 13 MEQ/L (ref 8–16)
BUN SERPL-MCNC: 15 MG/DL (ref 7–22)
CALCIUM SERPL-MCNC: 9.5 MG/DL (ref 8.5–10.5)
CHLORIDE SERPL-SCNC: 103 MEQ/L (ref 98–111)
CO2 SERPL-SCNC: 24 MEQ/L (ref 23–33)
CREAT SERPL-MCNC: 0.8 MG/DL (ref 0.4–1.2)
CREAT UR-MCNC: 127.9 MG/DL
DEPRECATED MEAN GLUCOSE BLD GHB EST-ACNC: 156 MG/DL (ref 70–126)
GFR SERPL CREATININE-BSD FRML MDRD: > 90 ML/MIN/1.73M2
GLUCOSE SERPL-MCNC: 167 MG/DL (ref 70–108)
HBA1C MFR BLD HPLC: 7.2 % (ref 4.4–6.4)
HCV IGG SERPL QL IA: NEGATIVE
MICROALBUMIN UR-MCNC: < 1.2 MG/DL
MICROALBUMIN/CREAT RATIO PNL UR: 9 MG/G (ref 0–30)
POTASSIUM SERPL-SCNC: 3.6 MEQ/L (ref 3.5–5.2)
SODIUM SERPL-SCNC: 140 MEQ/L (ref 135–145)

## 2024-05-10 ENCOUNTER — OFFICE VISIT (OUTPATIENT)
Dept: FAMILY MEDICINE CLINIC | Age: 49
End: 2024-05-10
Payer: COMMERCIAL

## 2024-05-10 VITALS
HEIGHT: 70 IN | DIASTOLIC BLOOD PRESSURE: 94 MMHG | RESPIRATION RATE: 18 BRPM | BODY MASS INDEX: 38.88 KG/M2 | SYSTOLIC BLOOD PRESSURE: 132 MMHG | WEIGHT: 271.6 LBS | TEMPERATURE: 97.4 F | OXYGEN SATURATION: 97 % | HEART RATE: 81 BPM

## 2024-05-10 DIAGNOSIS — R74.8 LIVER ENZYME ELEVATION: ICD-10-CM

## 2024-05-10 DIAGNOSIS — E87.6 HYPOKALEMIA: ICD-10-CM

## 2024-05-10 DIAGNOSIS — I10 PRIMARY HYPERTENSION: ICD-10-CM

## 2024-05-10 DIAGNOSIS — E11.65 UNCONTROLLED TYPE 2 DIABETES MELLITUS WITH HYPERGLYCEMIA (HCC): Primary | ICD-10-CM

## 2024-05-10 DIAGNOSIS — M79.671 RIGHT FOOT PAIN: ICD-10-CM

## 2024-05-10 DIAGNOSIS — M72.2 PLANTAR FASCIITIS OF RIGHT FOOT: ICD-10-CM

## 2024-05-10 DIAGNOSIS — E78.6 LOW HDL (UNDER 40): ICD-10-CM

## 2024-05-10 DIAGNOSIS — K21.9 GASTROESOPHAGEAL REFLUX DISEASE WITHOUT ESOPHAGITIS: ICD-10-CM

## 2024-05-10 PROCEDURE — 3080F DIAST BP >= 90 MM HG: CPT | Performed by: FAMILY MEDICINE

## 2024-05-10 PROCEDURE — 99214 OFFICE O/P EST MOD 30 MIN: CPT | Performed by: FAMILY MEDICINE

## 2024-05-10 PROCEDURE — 3075F SYST BP GE 130 - 139MM HG: CPT | Performed by: FAMILY MEDICINE

## 2024-05-10 PROCEDURE — 3051F HG A1C>EQUAL 7.0%<8.0%: CPT | Performed by: FAMILY MEDICINE

## 2024-05-10 RX ORDER — SITAGLIPTIN AND METFORMIN HYDROCHLORIDE 500; 50 MG/1; MG/1
1 TABLET, FILM COATED ORAL 2 TIMES DAILY WITH MEALS
Qty: 180 TABLET | Refills: 3 | Status: SHIPPED | OUTPATIENT
Start: 2024-05-10

## 2024-05-10 RX ORDER — MELOXICAM 15 MG/1
15 TABLET ORAL DAILY PRN
Qty: 30 TABLET | Refills: 0 | Status: SHIPPED | OUTPATIENT
Start: 2024-05-10 | End: 2024-06-09

## 2024-05-10 NOTE — PATIENT INSTRUCTIONS
[facility]  Address: [address], [place] [address]  Phone: [phone]    Use weight lifting in sitting position for now to improve metabolism  -- start with 5 lbs each side for execise  -- increase    To protect your eyes from damage due to diabetes, please schedule an appointment with your ophthalmologist. Please let us know if you need help doing this.

## 2024-05-10 NOTE — PROGRESS NOTES
sitting for a while. No injury or traumat that he recalls.    GERD doing well. Not too many nsaids or certain trigger foods due to this. Nexium works well but without it has symptosm.     Wt Readings from Last 10 Encounters:   05/10/24 123.2 kg (271 lb 9.6 oz)   01/24/24 126.1 kg (278 lb)   01/24/24 126.1 kg (278 lb)   01/05/24 126.8 kg (279 lb 9.6 oz)   11/21/23 127 kg (280 lb)   09/22/23 128.1 kg (282 lb 6.4 oz)   06/29/23 127.6 kg (281 lb 3.2 oz)     Review of Systems   Constitutional:  Negative for fatigue and fever.   Respiratory:  Negative for shortness of breath.    Cardiovascular:  Negative for chest pain and leg swelling.          Objective   Physical Exam   BP (!) 132/94 (Site: Right Upper Arm, Position: Sitting, Cuff Size: Large Adult)   Pulse 81   Temp 97.4 °F (36.3 °C) (Temporal)   Resp 18   Ht 1.778 m (5' 10\")   Wt 123.2 kg (271 lb 9.6 oz)   SpO2 97% Comment: Room Air  BMI 38.97 kg/m²      BP Readings from Last 10 Encounters:   05/10/24 (!) 132/94   01/24/24 (!) 142/88   01/05/24 (!) 144/90   09/22/23 (!) 158/100   06/29/23 130/70      Gen: NAD, AAO x 3, coherent, pleasant,   HEENT: Oropharynx clear   Neck: supple, no LAD, no thyromegaly, no carotid bruit   Chest: clear to auscultation bilaterally, good air exchange   Heart: regular rate and rhythm, no murmur.   Ext: right foot tenderness at medial aspect of heel, some radiating into arch.  Overall ROM is good. No swelling, redness or deformity.       Lab Results   Component Value Date    LABA1C 7.2 (H) 04/29/2024     Lab Results   Component Value Date     (H) 04/29/2024     Lab Results   Component Value Date    LABA1C 7.2 (H) 04/29/2024    LABA1C 7.9 01/05/2024    LABA1C 9.0 (H) 09/11/2023       Lab Results   Component Value Date     04/29/2024    K 3.6 04/29/2024     04/29/2024    CO2 24 04/29/2024    BUN 15 04/29/2024    CREATININE 0.8 04/29/2024    CALCIUM 9.5 04/29/2024    GLUCOSE 167 (H) 04/29/2024        Lab Results

## 2024-05-17 PROBLEM — M72.2 PLANTAR FASCIITIS OF RIGHT FOOT: Status: ACTIVE | Noted: 2024-05-17

## 2024-09-13 ENCOUNTER — OFFICE VISIT (OUTPATIENT)
Dept: FAMILY MEDICINE CLINIC | Age: 49
End: 2024-09-13

## 2024-09-13 VITALS
OXYGEN SATURATION: 96 % | RESPIRATION RATE: 16 BRPM | DIASTOLIC BLOOD PRESSURE: 90 MMHG | HEART RATE: 86 BPM | WEIGHT: 279 LBS | BODY MASS INDEX: 39.94 KG/M2 | HEIGHT: 70 IN | SYSTOLIC BLOOD PRESSURE: 142 MMHG

## 2024-09-13 DIAGNOSIS — R74.8 LIVER ENZYME ELEVATION: ICD-10-CM

## 2024-09-13 DIAGNOSIS — K21.9 GASTROESOPHAGEAL REFLUX DISEASE WITHOUT ESOPHAGITIS: ICD-10-CM

## 2024-09-13 DIAGNOSIS — E78.6 LOW HDL (UNDER 40): ICD-10-CM

## 2024-09-13 DIAGNOSIS — E11.9 CONTROLLED TYPE 2 DIABETES MELLITUS WITHOUT COMPLICATION, WITHOUT LONG-TERM CURRENT USE OF INSULIN (HCC): Primary | ICD-10-CM

## 2024-09-13 DIAGNOSIS — I10 PRIMARY HYPERTENSION: ICD-10-CM

## 2024-09-13 DIAGNOSIS — Z00.00 HEALTHCARE MAINTENANCE: ICD-10-CM

## 2024-09-13 RX ORDER — LOSARTAN POTASSIUM AND HYDROCHLOROTHIAZIDE 12.5; 1 MG/1; MG/1
1 TABLET ORAL DAILY
Qty: 90 TABLET | Refills: 1 | Status: SHIPPED | OUTPATIENT
Start: 2024-09-13

## 2024-09-13 SDOH — ECONOMIC STABILITY: FOOD INSECURITY: WITHIN THE PAST 12 MONTHS, YOU WORRIED THAT YOUR FOOD WOULD RUN OUT BEFORE YOU GOT MONEY TO BUY MORE.: NEVER TRUE

## 2024-09-13 SDOH — ECONOMIC STABILITY: FOOD INSECURITY: WITHIN THE PAST 12 MONTHS, THE FOOD YOU BOUGHT JUST DIDN'T LAST AND YOU DIDN'T HAVE MONEY TO GET MORE.: NEVER TRUE

## 2024-09-13 SDOH — ECONOMIC STABILITY: INCOME INSECURITY: HOW HARD IS IT FOR YOU TO PAY FOR THE VERY BASICS LIKE FOOD, HOUSING, MEDICAL CARE, AND HEATING?: NOT HARD AT ALL

## 2024-12-23 ENCOUNTER — OFFICE VISIT (OUTPATIENT)
Dept: FAMILY MEDICINE CLINIC | Age: 49
End: 2024-12-23

## 2024-12-23 VITALS
WEIGHT: 277.6 LBS | BODY MASS INDEX: 39.83 KG/M2 | SYSTOLIC BLOOD PRESSURE: 136 MMHG | DIASTOLIC BLOOD PRESSURE: 84 MMHG | OXYGEN SATURATION: 98 % | HEART RATE: 88 BPM | RESPIRATION RATE: 18 BRPM

## 2024-12-23 DIAGNOSIS — R31.9 HEMATURIA, UNSPECIFIED TYPE: ICD-10-CM

## 2024-12-23 DIAGNOSIS — Z12.5 SCREENING FOR PROSTATE CANCER: ICD-10-CM

## 2024-12-23 DIAGNOSIS — R35.0 URINARY FREQUENCY: Primary | ICD-10-CM

## 2024-12-23 DIAGNOSIS — E11.65 UNCONTROLLED TYPE 2 DIABETES MELLITUS WITH HYPERGLYCEMIA (HCC): ICD-10-CM

## 2024-12-23 DIAGNOSIS — I10 PRIMARY HYPERTENSION: ICD-10-CM

## 2024-12-23 DIAGNOSIS — Z00.00 HEALTHCARE MAINTENANCE: ICD-10-CM

## 2024-12-23 DIAGNOSIS — J06.9 VIRAL URI: ICD-10-CM

## 2024-12-23 LAB
BILIRUBIN, POC: NEGATIVE
BLOOD URINE, POC: NORMAL
CLARITY, POC: NORMAL
COLOR, POC: NORMAL
CREAT UR-MCNC: 114.8 MG/DL
GLUCOSE URINE, POC: NORMAL MG/DL
HBA1C MFR BLD: 9.1 %
KETONES, POC: NEGATIVE MG/DL
LEUKOCYTE EST, POC: NEGATIVE
MICROALBUMIN UR-MCNC: 4.91 MG/DL
MICROALBUMIN/CREAT RATIO PNL UR: 43 MG/G (ref 0–30)
NITRITE, POC: NEGATIVE
PH, POC: 6
PROTEIN, POC: NORMAL MG/DL
SPECIFIC GRAVITY, POC: 1.02
UROBILINOGEN, POC: 0.2 MG/DL

## 2024-12-23 RX ORDER — LOSARTAN POTASSIUM AND HYDROCHLOROTHIAZIDE 12.5; 1 MG/1; MG/1
1 TABLET ORAL DAILY
Qty: 90 TABLET | Refills: 1 | Status: SHIPPED | OUTPATIENT
Start: 2024-12-23

## 2024-12-23 RX ORDER — NITROFURANTOIN 25; 75 MG/1; MG/1
100 CAPSULE ORAL 2 TIMES DAILY
Qty: 14 CAPSULE | Refills: 0 | Status: SHIPPED | OUTPATIENT
Start: 2024-12-23 | End: 2024-12-30

## 2024-12-23 RX ORDER — SITAGLIPTIN AND METFORMIN HYDROCHLORIDE 500; 50 MG/1; MG/1
1 TABLET, FILM COATED ORAL 2 TIMES DAILY WITH MEALS
Qty: 180 TABLET | Refills: 3 | Status: SHIPPED | OUTPATIENT
Start: 2024-12-23

## 2024-12-23 ASSESSMENT — PATIENT HEALTH QUESTIONNAIRE - PHQ9
SUM OF ALL RESPONSES TO PHQ QUESTIONS 1-9: 0
SUM OF ALL RESPONSES TO PHQ QUESTIONS 1-9: 0
1. LITTLE INTEREST OR PLEASURE IN DOING THINGS: NOT AT ALL
SUM OF ALL RESPONSES TO PHQ9 QUESTIONS 1 & 2: 0
SUM OF ALL RESPONSES TO PHQ QUESTIONS 1-9: 0
SUM OF ALL RESPONSES TO PHQ QUESTIONS 1-9: 0
2. FEELING DOWN, DEPRESSED OR HOPELESS: NOT AT ALL

## 2024-12-23 ASSESSMENT — ENCOUNTER SYMPTOMS: SHORTNESS OF BREATH: 0

## 2024-12-23 NOTE — PROGRESS NOTES
Gerardo Whitman (:  1975) is a 49 y.o. male,Established patient, here for evaluation of the following chief complaint(s):  Diabetes (3 month follow up)      Assessment & Plan   ASSESSMENT/PLAN:  1. Urinary frequency  -     POCT Urinalysis no Micro  -     Culture, Urine  -     nitrofurantoin, macrocrystal-monohydrate, (MACROBID) 100 MG capsule; Take 1 capsule by mouth 2 times daily for 7 days, Disp-14 capsule, R-0Normal  2. Primary hypertension  -     losartan-hydroCHLOROthiazide (HYZAAR) 100-12.5 MG per tablet; Take 1 tablet by mouth daily, Disp-90 tablet, R-1Normal  3. Uncontrolled type 2 diabetes mellitus with hyperglycemia (HCC)  -     sitaGLIPtan-metFORMIN (JANUMET)  MG per tablet; Take 1 tablet by mouth 2 times daily (with meals), Disp-180 tablet, R-3Normal  4. Screening for prostate cancer  -     PSA Screening; Future  5. Healthcare maintenance  -     Comprehensive Metabolic Panel; Future  -     CBC with Auto Differential; Future  -     Hemoglobin A1C; Future  -     Lipid Panel; Future  -     Magnesium; Future  -     Albumin/Creatinine Ratio, Urine; Future  -     TSH reflex to FT4; Future  -     PSA Screening; Future  6. Viral URI    Hematuria noted -- no abdominal/flank pain, no fever/chills.  Since viral URI.  Will cover for possible UTI.  Will obtain blood work.  We discussed follow up will be needed, including a repeat UA in the near future. If it continues, then imaging and referral to specialist may be needed.   Assessment & Plan  1. Diabetes Mellitus, uncontrolled.  Needs to restart his medication.  He has been working more and has reduced his intake of Mountain Dew, which is beneficial. He is advised to continue dietary modifications and increase physical activity, aiming for at least 10 minutes of exercise daily. Blood work will be ordered to monitor his condition.     2. Acid Reflux.  He is advised to continue using over-the-counter Nexium for acid reflux management as insurance may not

## 2024-12-24 DIAGNOSIS — E11.65 UNCONTROLLED TYPE 2 DIABETES MELLITUS WITH HYPERGLYCEMIA (HCC): ICD-10-CM

## 2024-12-24 RX ORDER — LANCETS 30 GAUGE
1 EACH MISCELLANEOUS DAILY
Qty: 300 EACH | Refills: 1 | Status: SHIPPED | OUTPATIENT
Start: 2024-12-24

## 2024-12-24 NOTE — TELEPHONE ENCOUNTER
Gerardo Whitman called requesting a refill on the following medications:  Requested Prescriptions     Pending Prescriptions Disp Refills    Lancets MISC 300 each 1     Si each by Does not apply route daily       Date of last visit: 2024  Date of next visit (if applicable):2025  Date of last refill: 24  Pharmacy Name: [facility]

## 2024-12-25 PROBLEM — R31.9 HEMATURIA: Status: ACTIVE | Noted: 2024-12-25

## 2024-12-25 LAB
BACTERIA UR CULT: ABNORMAL
ORGANISM: ABNORMAL

## 2024-12-27 ENCOUNTER — TELEPHONE (OUTPATIENT)
Dept: FAMILY MEDICINE CLINIC | Age: 49
End: 2024-12-27

## 2024-12-27 DIAGNOSIS — R31.9 HEMATURIA, UNSPECIFIED TYPE: Primary | ICD-10-CM

## 2024-12-27 NOTE — RESULT ENCOUNTER NOTE
Please let patient know that his urine culture did not grow any pathogenic bacteria. It is not clear if the blood in the urine was caused by infection or other process. I recommend a repeat urine 1 week after antibiotic is completed.  If hematuria continues, then we recommend further evaluation. Order placed for urine culture with reflex culture, lab collect.  Thank you.

## 2024-12-27 NOTE — TELEPHONE ENCOUNTER
----- Message from Dr. Liana Goetz MD sent at 12/27/2024  9:32 AM EST -----  Please let patient know that his urine culture did not grow any pathogenic bacteria. It is not clear if the blood in the urine was caused by infection or other process. I recommend a repeat urine 1 week after antibiotic is completed.  If hematuria continues, then we recommend further evaluation. Order placed for urine culture with reflex culture, lab collect.  Thank you.

## 2025-01-02 ENCOUNTER — TELEPHONE (OUTPATIENT)
Dept: FAMILY MEDICINE CLINIC | Age: 50
End: 2025-01-02

## 2025-01-02 NOTE — TELEPHONE ENCOUNTER
Patient stopped in to the office and informed him of the results. He is aware to repeat the urine  1 week since he completed antibiotic yesterday.

## 2025-01-02 NOTE — TELEPHONE ENCOUNTER
Patient stopped in the office and stated that [employer] is telling him that his Janumet needs a Prior Auth. Submitted PA in EPic

## 2025-01-07 ENCOUNTER — HOSPITAL ENCOUNTER (OUTPATIENT)
Age: 50
Discharge: HOME | End: 2025-01-07
Payer: COMMERCIAL

## 2025-01-07 PROCEDURE — 81001 URINALYSIS AUTO W/SCOPE: CPT

## 2025-01-08 DIAGNOSIS — R80.9 PROTEINURIA, UNSPECIFIED TYPE: ICD-10-CM

## 2025-01-08 DIAGNOSIS — R31.9 HEMATURIA, UNSPECIFIED TYPE: Primary | ICD-10-CM

## 2025-01-08 DIAGNOSIS — E11.65 UNCONTROLLED TYPE 2 DIABETES MELLITUS WITH HYPERGLYCEMIA (HCC): ICD-10-CM

## 2025-01-08 DIAGNOSIS — R35.0 URINARY FREQUENCY: ICD-10-CM

## 2025-01-08 LAB
BACTERIA URNS QL MICRO: ABNORMAL /HPF
BILIRUB UR QL STRIP.AUTO: NEGATIVE
CASTS #/AREA URNS LPF: ABNORMAL /LPF
CASTS 2: ABNORMAL /LPF
CHARACTER UR: ABNORMAL
COLOR, UA: ABNORMAL
CRYSTALS URNS MICRO: ABNORMAL
EPITHELIAL CELLS, UA: ABNORMAL /HPF
GLUCOSE UR QL STRIP.AUTO: >= 1000 MG/DL
HGB UR QL STRIP.AUTO: ABNORMAL
KETONES UR QL STRIP.AUTO: ABNORMAL
MISCELLANEOUS 2: ABNORMAL
NITRITE UR QL STRIP: NEGATIVE
PH UR STRIP.AUTO: 5.5 [PH] (ref 5–9)
PROT UR STRIP.AUTO-MCNC: 100 MG/DL
RBC URINE: > 200 /HPF
RENAL EPI CELLS #/AREA URNS HPF: ABNORMAL /[HPF]
SP GR UR REFRACT.AUTO: > 1.03 (ref 1–1.03)
UROBILINOGEN, URINE: 0.2 EU/DL (ref 0–1)
WBC #/AREA URNS HPF: ABNORMAL /HPF
WBC #/AREA URNS HPF: ABNORMAL /[HPF]
YEAST LIKE FUNGI URNS QL MICRO: ABNORMAL

## 2025-01-08 NOTE — RESULT ENCOUNTER NOTE
Please let patient know that his urine studies are again abnormal with large amount of blood and increased protein.  We need the blood work ordered at last visit and also the ultrasounds just ordered to check kidney and bladder.

## 2025-01-09 ENCOUNTER — TELEPHONE (OUTPATIENT)
Dept: FAMILY MEDICINE CLINIC | Age: 50
End: 2025-01-09

## 2025-01-09 NOTE — TELEPHONE ENCOUNTER
----- Message from Dr. Liana Goetz MD sent at 1/8/2025  6:08 PM EST -----  Please let patient know that his urine studies are again abnormal with large amount of blood and increased protein.  We need the blood work ordered at last visit and also the ultrasounds just ordered to check kidney and bladder.

## 2025-03-22 ENCOUNTER — HOSPITAL ENCOUNTER (OUTPATIENT)
Age: 50
Discharge: HOME | End: 2025-03-22
Payer: COMMERCIAL

## 2025-03-22 DIAGNOSIS — Z00.00 HEALTHCARE MAINTENANCE: ICD-10-CM

## 2025-03-22 DIAGNOSIS — E11.65 UNCONTROLLED TYPE 2 DIABETES MELLITUS WITH HYPERGLYCEMIA (HCC): ICD-10-CM

## 2025-03-22 DIAGNOSIS — Z12.5 SCREENING FOR PROSTATE CANCER: ICD-10-CM

## 2025-03-22 LAB
ALBUMIN SERPL BCG-MCNC: 4.1 G/DL (ref 3.4–4.9)
ALP SERPL-CCNC: 64 U/L (ref 40–129)
ALT SERPL W/O P-5'-P-CCNC: 74 U/L (ref 10–50)
ANION GAP SERPL CALC-SCNC: 14 MEQ/L (ref 8–16)
AST SERPL-CCNC: 44 U/L (ref 10–50)
BASOPHILS ABSOLUTE: 0 THOU/MM3 (ref 0–0.1)
BASOPHILS NFR BLD AUTO: 0.4 %
BILIRUB SERPL-MCNC: 0.6 MG/DL (ref 0.3–1.2)
BUN SERPL-MCNC: 14 MG/DL (ref 8–23)
CALCIUM SERPL-MCNC: 9.8 MG/DL (ref 8.6–10)
CHLORIDE SERPL-SCNC: 101 MEQ/L (ref 98–111)
CHOLEST SERPL-MCNC: 153 MG/DL (ref 100–199)
CO2 SERPL-SCNC: 25 MEQ/L (ref 22–29)
CREAT SERPL-MCNC: 0.8 MG/DL (ref 0.7–1.2)
CREAT UR-MCNC: 152 MG/DL
DEPRECATED RDW RBC AUTO: 41.6 FL (ref 35–45)
EOSINOPHIL NFR BLD AUTO: 3 %
EOSINOPHILS ABSOLUTE: 0.3 THOU/MM3 (ref 0–0.4)
ERYTHROCYTE [DISTWIDTH] IN BLOOD BY AUTOMATED COUNT: 13.5 % (ref 11.5–14.5)
GFR SERPL CREATININE-BSD FRML MDRD: > 90 ML/MIN/1.73M2
GLUCOSE SERPL-MCNC: 127 MG/DL (ref 74–109)
HCT VFR BLD AUTO: 42.9 % (ref 42–52)
HDLC SERPL-MCNC: 35 MG/DL
HGB BLD-MCNC: 14.6 GM/DL (ref 14–18)
IMM GRANULOCYTES # BLD AUTO: 0.05 THOU/MM3 (ref 0–0.07)
IMM GRANULOCYTES NFR BLD AUTO: 0.6 %
LDLC SERPL CALC-MCNC: 91 MG/DL
LYMPHOCYTES ABSOLUTE: 3.1 THOU/MM3 (ref 1–4.8)
LYMPHOCYTES NFR BLD AUTO: 34.7 %
MAGNESIUM SERPL-MCNC: 2.1 MG/DL (ref 1.6–2.6)
MCH RBC QN AUTO: 28.9 PG (ref 26–33)
MCHC RBC AUTO-ENTMCNC: 34 GM/DL (ref 32.2–35.5)
MCV RBC AUTO: 85 FL (ref 80–94)
MICROALBUMIN UR-MCNC: 13.8 MG/DL
MICROALBUMIN/CREAT RATIO PNL UR: 91 MG/G (ref 0–30)
MONOCYTES ABSOLUTE: 0.9 THOU/MM3 (ref 0.4–1.3)
MONOCYTES NFR BLD AUTO: 10.5 %
NEUTROPHILS ABSOLUTE: 4.6 THOU/MM3 (ref 1.8–7.7)
NEUTROPHILS NFR BLD AUTO: 50.8 %
NRBC BLD AUTO-RTO: 0 /100 WBC
PLATELET # BLD AUTO: 198 THOU/MM3 (ref 130–400)
PMV BLD AUTO: 11.8 FL (ref 9.4–12.4)
POTASSIUM SERPL-SCNC: 3.9 MEQ/L (ref 3.5–5.2)
PROT SERPL-MCNC: 6.9 G/DL (ref 6.4–8.3)
PSA SERPL-MCNC: 0.75 NG/ML (ref 0–1)
RBC # BLD AUTO: 5.05 MILL/MM3 (ref 4.7–6.1)
SODIUM SERPL-SCNC: 140 MEQ/L (ref 135–145)
TRIGL SERPL-MCNC: 137 MG/DL (ref 0–199)
TSH SERPL DL<=0.05 MIU/L-ACNC: 1.21 UIU/ML (ref 0.27–4.2)
WBC # BLD AUTO: 9 THOU/MM3 (ref 4.8–10.8)

## 2025-03-22 PROCEDURE — 80061 LIPID PANEL: CPT

## 2025-03-22 PROCEDURE — 83735 ASSAY OF MAGNESIUM: CPT

## 2025-03-22 PROCEDURE — 80053 COMPREHEN METABOLIC PANEL: CPT

## 2025-03-22 PROCEDURE — G0103 PSA SCREENING: HCPCS

## 2025-03-22 PROCEDURE — 85025 COMPLETE CBC W/AUTO DIFF WBC: CPT

## 2025-03-22 PROCEDURE — 84443 ASSAY THYROID STIM HORMONE: CPT

## 2025-03-22 PROCEDURE — 82043 UR ALBUMIN QUANTITATIVE: CPT

## 2025-03-22 PROCEDURE — 36415 COLL VENOUS BLD VENIPUNCTURE: CPT

## 2025-03-27 ENCOUNTER — RESULTS FOLLOW-UP (OUTPATIENT)
Dept: FAMILY MEDICINE CLINIC | Age: 50
End: 2025-03-27

## 2025-04-16 ENCOUNTER — OFFICE VISIT (OUTPATIENT)
Dept: FAMILY MEDICINE CLINIC | Age: 50
End: 2025-04-16

## 2025-04-16 VITALS
HEART RATE: 87 BPM | WEIGHT: 274.8 LBS | DIASTOLIC BLOOD PRESSURE: 82 MMHG | SYSTOLIC BLOOD PRESSURE: 126 MMHG | OXYGEN SATURATION: 97 % | BODY MASS INDEX: 39.43 KG/M2

## 2025-04-16 DIAGNOSIS — R80.9 MICROALBUMINURIA: ICD-10-CM

## 2025-04-16 DIAGNOSIS — E78.6 LOW HDL (UNDER 40): ICD-10-CM

## 2025-04-16 DIAGNOSIS — I10 PRIMARY HYPERTENSION: ICD-10-CM

## 2025-04-16 DIAGNOSIS — E11.65 UNCONTROLLED TYPE 2 DIABETES MELLITUS WITH HYPERGLYCEMIA (HCC): ICD-10-CM

## 2025-04-16 DIAGNOSIS — E11.9 TYPE 2 DIABETES MELLITUS WITHOUT COMPLICATION, WITHOUT LONG-TERM CURRENT USE OF INSULIN: Primary | ICD-10-CM

## 2025-04-16 DIAGNOSIS — R74.01 ELEVATED ALT MEASUREMENT: ICD-10-CM

## 2025-04-16 PROBLEM — E87.6 HYPOKALEMIA: Status: RESOLVED | Noted: 2023-09-22 | Resolved: 2025-04-16

## 2025-04-16 LAB — HBA1C MFR BLD: 8.8 %

## 2025-04-16 RX ORDER — SEMAGLUTIDE 0.68 MG/ML
INJECTION, SOLUTION SUBCUTANEOUS
Qty: 3 ML | Refills: 1 | Status: SHIPPED | OUTPATIENT
Start: 2025-04-16

## 2025-04-16 RX ORDER — METFORMIN HYDROCHLORIDE 500 MG/1
1000 TABLET, EXTENDED RELEASE ORAL
Qty: 180 TABLET | Refills: 5 | Status: SHIPPED | OUTPATIENT
Start: 2025-04-16

## 2025-04-16 SDOH — ECONOMIC STABILITY: FOOD INSECURITY: WITHIN THE PAST 12 MONTHS, YOU WORRIED THAT YOUR FOOD WOULD RUN OUT BEFORE YOU GOT MONEY TO BUY MORE.: NEVER TRUE

## 2025-04-16 SDOH — ECONOMIC STABILITY: FOOD INSECURITY: WITHIN THE PAST 12 MONTHS, THE FOOD YOU BOUGHT JUST DIDN'T LAST AND YOU DIDN'T HAVE MONEY TO GET MORE.: NEVER TRUE

## 2025-04-16 ASSESSMENT — PATIENT HEALTH QUESTIONNAIRE - PHQ9
2. FEELING DOWN, DEPRESSED OR HOPELESS: SEVERAL DAYS
SUM OF ALL RESPONSES TO PHQ QUESTIONS 1-9: 1
1. LITTLE INTEREST OR PLEASURE IN DOING THINGS: NOT AT ALL

## 2025-04-16 ASSESSMENT — ENCOUNTER SYMPTOMS: SHORTNESS OF BREATH: 0

## 2025-04-16 NOTE — PATIENT INSTRUCTIONS
Schedule and obtain urinary bladder and kidney ultrasound     Goal of A1c < 7.0    Fasting blood glucose < 130 and   2 hours after eating < 180    Increase exercise -- 10 minutes a day at least, at least 5 days    Check out: [employer] website for ordering meals     [employer] coupon  https://[email or web address]/savings-and-resources/save-on-ozempic.html        Copied from [email or web address]-plate/  On 8/2/2020

## 2025-04-16 NOTE — PROGRESS NOTES
Gerardo Whitman (:  1975) is a 49 y.o. male,Established patient, here for evaluation of the following chief complaint(s):  4 month FOLLOW UP      Assessment & Plan   ASSESSMENT/PLAN:  1. Type 2 diabetes mellitus without complication, without long-term current use of insulin  -     POCT glycosylated hemoglobin (Hb A1C)  -      DIABETES FOOT EXAM  -     metFORMIN (GLUCOPHAGE-XR) 500 MG extended release tablet; Take 2 tablets by mouth daily (with breakfast), Disp-180 tablet, R-5Normal  -     Semaglutide,0.25 or 0.5MG/DOS, (OZEMPIC, 0.25 OR 0.5 MG/DOSE,) 2 MG/3ML SOPN; 0.25 mg subcutaneous weekly for 4 weeks, then 0.5 mg subcutaneous weekly for another 4 weeks., Disp-3 mL, R-1Normal  2. Elevated ALT measurement  3. Microalbuminuria  4. Low HDL (under 40)  5. Primary hypertension  6. Uncontrolled type 2 diabetes mellitus with hyperglycemia (HCC)    Discussed benefits of controlled Dmii and risk of uncontrolled dm ii  -- janumet stopped. Ozempic + metformin new regimen.  -- currently not on statin, LDL 91. Adjust to above  He agrees to more aggressive management in addition to continue to make changes to lifestyle  dietary counseling given detailing and working through some barriers  Goal < 7 %    Return in about 5 months (around 2025).         Subjective   SUBJECTIVE/OBJECTIVE:  HPI    Better compliance with meds. Slow weight loss.  7Up pop almost daily. Not sure of alterrnative. Likes it   Diabetic clinic done in the past. Not helpful. Feels like changes for meals are hard  Job issues/stress, long drive currently -- limit activity  Reviewed labs -- ALT and alb/creat up  HTN is stable  Denies SE of medication    Wt Readings from Last 10 Encounters:   25 124.6 kg (274 lb 12.8 oz)   24 125.9 kg (277 lb 9.6 oz)   24 126.6 kg (279 lb)   05/10/24 123.2 kg (271 lb 9.6 oz)   24 126.1 kg (278 lb)   24 126.1 kg (278 lb)   24 126.8 kg (279 lb 9.6 oz)   23 127 kg (280 lb)

## 2025-04-21 ENCOUNTER — HOSPITAL ENCOUNTER (OUTPATIENT)
Dept: ULTRASOUND IMAGING | Age: 50
Discharge: HOME | End: 2025-04-21
Attending: FAMILY MEDICINE
Payer: COMMERCIAL

## 2025-04-21 DIAGNOSIS — R31.9 HEMATURIA, UNSPECIFIED TYPE: ICD-10-CM

## 2025-04-21 DIAGNOSIS — R35.0 URINARY FREQUENCY: ICD-10-CM

## 2025-04-21 DIAGNOSIS — E11.65 UNCONTROLLED TYPE 2 DIABETES MELLITUS WITH HYPERGLYCEMIA (HCC): ICD-10-CM

## 2025-04-21 DIAGNOSIS — R80.9 PROTEINURIA, UNSPECIFIED TYPE: ICD-10-CM

## 2025-04-21 PROCEDURE — 76770 US EXAM ABDO BACK WALL COMP: CPT

## 2025-04-22 ENCOUNTER — RESULTS FOLLOW-UP (OUTPATIENT)
Dept: FAMILY MEDICINE CLINIC | Age: 50
End: 2025-04-22

## 2025-04-22 DIAGNOSIS — N13.30 HYDRONEPHROSIS, UNSPECIFIED HYDRONEPHROSIS TYPE: Primary | ICD-10-CM

## 2025-04-22 DIAGNOSIS — N13.9 URINARY OBSTRUCTION: ICD-10-CM

## 2025-04-22 DIAGNOSIS — R31.9 HEMATURIA, UNSPECIFIED TYPE: ICD-10-CM

## 2025-04-22 NOTE — RESULT ENCOUNTER NOTE
Please let patient know that his bladder and kidney scan are abnormal.  There is signs of urinary obstruction and calcifications in the bladder wall.   I recommend he see an urologist soon.  I placed an urgent referral to Dr. Cross; we can change it if he has another preference.    0

## 2025-04-29 ENCOUNTER — TELEPHONE (OUTPATIENT)
Dept: UROLOGY | Age: 50
End: 2025-04-29

## 2025-04-29 ENCOUNTER — OFFICE VISIT (OUTPATIENT)
Dept: UROLOGY | Age: 50
End: 2025-04-29
Payer: COMMERCIAL

## 2025-04-29 VITALS — WEIGHT: 274 LBS | RESPIRATION RATE: 20 BRPM | HEIGHT: 70 IN | BODY MASS INDEX: 39.22 KG/M2

## 2025-04-29 DIAGNOSIS — N13.30 HYDRONEPHROSIS, UNSPECIFIED HYDRONEPHROSIS TYPE: Primary | ICD-10-CM

## 2025-04-29 DIAGNOSIS — R33.9 URINARY RETENTION: ICD-10-CM

## 2025-04-29 LAB
BILIRUBIN, URINE: NEGATIVE
BLOOD URINE, POC: ABNORMAL
CHARACTER, URINE: CLEAR
COLOR, UA: YELLOW
GLUCOSE URINE: 100 MG/DL
KETONES, URINE: NEGATIVE
LEUKOCYTE CLUMPS, URINE: NEGATIVE
NITRITE, URINE: NEGATIVE
PH, URINE: 5.5 (ref 5–9)
POST VOID RESIDUAL (PVR): NORMAL ML
PROTEIN, URINE: 30 MG/DL
SPECIFIC GRAVITY UA: <= 1.005 (ref 1–1.03)
UROBILINOGEN, URINE: 0.2 EU/DL (ref 0–1)

## 2025-04-29 PROCEDURE — 81003 URINALYSIS AUTO W/O SCOPE: CPT | Performed by: UROLOGY

## 2025-04-29 PROCEDURE — 51798 US URINE CAPACITY MEASURE: CPT | Performed by: UROLOGY

## 2025-04-29 PROCEDURE — 99204 OFFICE O/P NEW MOD 45 MIN: CPT | Performed by: UROLOGY

## 2025-04-29 NOTE — PROGRESS NOTES
Duarte Ayala MD.    [facility] [place] [facility]  [facility] UROLOGY  [address]  [address]  [facility] [address]  Dept: [phone]  Dept Fax: [phone]  Loc: [phone]    [facility] Urology Office Note -     Patient:  Gerardo Whitman  YOB: 1975    The patient is a 49 y.o. male who presents today for evaluation of the following problems:   Chief Complaint   Patient presents with    New Patient     Hydronephrosis with urinary obstruction.    Pt states he is having urgency and UTI symptoms but was unable to give urine sample.    referred/consultation requested by Liana Goetz MD.    History of Present Illness:    Hydronephrosis  Incidentally found    Urinary retention  Hgbaic > 8  High volume retention > 2L      Poss kidney stones  Calcifications in bladder?      Requested/reviewed records from Liana Goetz MD office and/or outside physician/EMR    (Patient's old records have been requested, reviewed and pertinent findings summarized in today's note.)    Procedures Today: N/A      Last several PSA's:  Lab Results   Component Value Date    PSA 0.75 03/22/2025       Last total testosterone:  No results found for: \"TESTOSTERONE\"    Urinalysis today:  Results for orders placed or performed in visit on 04/29/25   POCT Urinalysis No Micro (Auto)   Result Value Ref Range    Glucose, Ur 100 (A) NEGATIVE mg/dl    Bilirubin, Urine Negative     Ketones, Urine Negative NEGATIVE    Specific Gravity, UA <=1.005 1.002 - 1.030    Blood, UA POC Moderate (A) NEGATIVE    pH, Urine 5.50 5.0 - 9.0    Protein, Urine 30 (A) NEGATIVE mg/dl    Urobilinogen, Urine 0.20 0.0 - 1.0 eu/dl    Nitrite, Urine Negative NEGATIVE    Leukocyte Clumps, Urine Negative NEGATIVE    Color, UA Yellow YELLOW-STRAW    Character, Urine Clear [id number].[id number]   poct post void residual   Result Value Ref Range    post void residual 999+ ml       Last BUN and creatinine:  Lab Results   Component Value Date    BUN

## 2025-04-29 NOTE — TELEPHONE ENCOUNTER
Patient scheduled for CT ABD PELV WO  at [facility] on 5/14/2025.  Arrival of 740 for a 810 scan time.  Order mailed with instructions  to the patient

## 2025-04-29 NOTE — PROGRESS NOTES
DIAGNOSIS/INDICATION:  Urinary Retention     Following Dr Ayala  plan of care. Patient instructed on self cath with 16 Fr straight catheter with a residual of 2600 cc.    Patient was supplied with a 16Fr straight catheter containing a water soluble solution, and a urinary hat for voiding log. Patient was instructed to wash his hands and clean the head of the penis. While patient is sitting on the edge of the chair I explained to the patient I will place the catheter first so he knows what to expect. Once I had shown the patient I gave the catheter to him and ensured he was comfortable with placing the catheter.     Patient was advised that he needs to void on his own prior to using the catheter, and then to keep track of urine amount from the catheter. Prescription for catheter will be faxed to company, supplying a new catheter for each use.     Brand of intermittent catheter used for teaching that will need ordered for patient BARD

## 2025-04-30 ENCOUNTER — TELEPHONE (OUTPATIENT)
Dept: FAMILY MEDICINE CLINIC | Age: 50
End: 2025-04-30

## 2025-05-12 ENCOUNTER — TELEPHONE (OUTPATIENT)
Dept: UROLOGY | Age: 50
End: 2025-05-12

## 2025-05-12 NOTE — TELEPHONE ENCOUNTER
Patient called regarding cath supplies.    Patient has not returned call to [employer] to call to set up shipment.    Patient to call [phone] janice from [employer] and he will do a 3 way call with [employer] to set up shipment

## 2025-05-14 ENCOUNTER — HOSPITAL ENCOUNTER (OUTPATIENT)
Dept: CT IMAGING | Age: 50
Discharge: HOME | End: 2025-05-14
Attending: UROLOGY
Payer: COMMERCIAL

## 2025-05-14 DIAGNOSIS — N13.30 HYDRONEPHROSIS, UNSPECIFIED HYDRONEPHROSIS TYPE: ICD-10-CM

## 2025-05-14 PROCEDURE — 74176 CT ABD & PELVIS W/O CONTRAST: CPT

## 2025-05-17 DIAGNOSIS — I10 PRIMARY HYPERTENSION: ICD-10-CM

## 2025-05-19 RX ORDER — LOSARTAN POTASSIUM AND HYDROCHLOROTHIAZIDE 12.5; 1 MG/1; MG/1
1 TABLET ORAL DAILY
Qty: 90 TABLET | Refills: 1 | Status: SHIPPED | OUTPATIENT
Start: 2025-05-19

## 2025-05-19 NOTE — TELEPHONE ENCOUNTER
Gerardo Whitman called requesting a refill on the following medications:  Requested Prescriptions     Pending Prescriptions Disp Refills    losartan-hydroCHLOROthiazide (HYZAAR) 100-12.5 MG per tablet [Pharmacy Med Name: Losartan Potassium/Hydrochlorothiazide 100mg-12.5mg Tablet] 90 tablet 0     Sig: Take 1 tablet by mouth daily.       Date of last visit: 4/16/2025  Date of next visit (if applicable):9/17/2025  Date of last refill: 12/23/24  Pharmacy Name: [facility]

## 2025-05-22 ENCOUNTER — PROCEDURE VISIT (OUTPATIENT)
Dept: UROLOGY | Age: 50
End: 2025-05-22
Payer: COMMERCIAL

## 2025-05-22 ENCOUNTER — TELEPHONE (OUTPATIENT)
Dept: FAMILY MEDICINE CLINIC | Age: 50
End: 2025-05-22

## 2025-05-22 VITALS — BODY MASS INDEX: 39.22 KG/M2 | RESPIRATION RATE: 18 BRPM | WEIGHT: 274 LBS | HEIGHT: 70 IN

## 2025-05-22 DIAGNOSIS — N13.30 HYDRONEPHROSIS, UNSPECIFIED HYDRONEPHROSIS TYPE: ICD-10-CM

## 2025-05-22 DIAGNOSIS — R33.9 URINARY RETENTION: Primary | ICD-10-CM

## 2025-05-22 PROCEDURE — 52000 CYSTOURETHROSCOPY: CPT | Performed by: UROLOGY

## 2025-05-22 NOTE — TELEPHONE ENCOUNTER
Call received from Maureen @ [facility]  Patient is scheduled to have a procedure but when reviewing medications it was different than what is listed in chart    Left message for patient to call back to clarify    Please fax updated med list to Maureen [phone]

## 2025-05-22 NOTE — TELEPHONE ENCOUNTER
Discussed medications with patient  States he was using up the remainder of janumet he had     Advised Dr Goetz wants him to d/c janumet and start ozempic  Prior auth approved  Medication at [facility] pharmacy    He verbalized understanding    Updated med list faxed to Maureen as requested

## 2025-05-22 NOTE — PROGRESS NOTES
Cystoscopy    Operative Note    Patient:  Gerardo Whitman  MRN: [id number]  YOB: 1975    Date: 05/22/25  Surgeon: MISTY DURBIN MD  Anesthesia: Urojet Local  Indications: urinary retention  Position: Supine  EBL: 0 ml    Findings:   The patient was prepped and draped in the usual sterile fashion.  The flexible cystoscope was advanced through the urethra and into the bladder.  The bladder was thoroughly inspected and the following was noted:    Residual Urine: significant\" \" . urine cloudy and concerning for infection  Urethra: No abnormalities of the urethra are noted. Urethral dilation was not performed.    Prostate: lateral lobe hypertrophy + present, prostate moderately obstructing, intravesical extension of prostate not present. There was no previous TURP defect.   Bladder: no tumor noted .   Severe trabeculation noted.  no bladder diverticulum. Visualization poor due to significant amount of urine ( bladder stones present)  Ureters: difficult to visualize    The cystoscope was removed.  The patient tolerated the procedure well.  Pt cannot CIC  Declines lujan catheter  Bl hydro , high volume retention  I believe he has neurogenic bladder as well- pts work does not allow for chronic catheter and he cannot CIC      Cysto cystolithalopaxy and greenlight (60) catheter overnight.

## 2025-05-23 ENCOUNTER — TELEPHONE (OUTPATIENT)
Dept: UROLOGY | Age: 50
End: 2025-05-23

## 2025-05-30 ENCOUNTER — PREP FOR PROCEDURE (OUTPATIENT)
Dept: UROLOGY | Age: 50
End: 2025-05-30

## 2025-05-30 ENCOUNTER — TELEPHONE (OUTPATIENT)
Dept: UROLOGY | Age: 50
End: 2025-05-30

## 2025-05-30 PROBLEM — N13.30 HYDRONEPHROSIS: Status: ACTIVE | Noted: 2025-05-23

## 2025-05-30 PROBLEM — R33.9 RETENTION OF URINE, UNSPECIFIED: Status: ACTIVE | Noted: 2025-05-23

## 2025-06-03 RX ORDER — SODIUM CHLORIDE 9 MG/ML
INJECTION, SOLUTION INTRAVENOUS PRN
Status: CANCELLED | OUTPATIENT
Start: 2025-06-03

## 2025-06-03 RX ORDER — SODIUM CHLORIDE 0.9 % (FLUSH) 0.9 %
5-40 SYRINGE (ML) INJECTION EVERY 12 HOURS SCHEDULED
Status: CANCELLED | OUTPATIENT
Start: 2025-06-03

## 2025-06-03 RX ORDER — SODIUM CHLORIDE 0.9 % (FLUSH) 0.9 %
5-40 SYRINGE (ML) INJECTION PRN
Status: CANCELLED | OUTPATIENT
Start: 2025-06-03

## 2025-06-17 DIAGNOSIS — Z01.818 PRE-OP TESTING: ICD-10-CM

## 2025-06-17 DIAGNOSIS — R33.9 RETENTION OF URINE, UNSPECIFIED: Primary | ICD-10-CM

## 2025-06-17 DIAGNOSIS — N13.30 HYDRONEPHROSIS, UNSPECIFIED HYDRONEPHROSIS TYPE: ICD-10-CM

## 2025-06-18 ENCOUNTER — HOSPITAL ENCOUNTER (OUTPATIENT)
Age: 50
Discharge: HOME | End: 2025-06-18
Payer: COMMERCIAL

## 2025-06-18 DIAGNOSIS — Z01.818 PRE-OP TESTING: ICD-10-CM

## 2025-06-18 DIAGNOSIS — R33.9 RETENTION OF URINE, UNSPECIFIED: ICD-10-CM

## 2025-06-18 DIAGNOSIS — N13.30 HYDRONEPHROSIS, UNSPECIFIED HYDRONEPHROSIS TYPE: ICD-10-CM

## 2025-06-18 LAB
EKG ATRIAL RATE: 87 BPM
EKG P AXIS: 58 DEGREES
EKG P-R INTERVAL: 160 MS
EKG Q-T INTERVAL: 378 MS
EKG QRS DURATION: 96 MS
EKG QTC CALCULATION (BAZETT): 454 MS
EKG R AXIS: -26 DEGREES
EKG T AXIS: 8 DEGREES
EKG VENTRICULAR RATE: 87 BPM

## 2025-06-18 PROCEDURE — 87147 CULTURE TYPE IMMUNOLOGIC: CPT

## 2025-06-18 PROCEDURE — 93005 ELECTROCARDIOGRAM TRACING: CPT | Performed by: UROLOGY

## 2025-06-18 PROCEDURE — 87186 SC STD MICRODIL/AGAR DIL: CPT

## 2025-06-18 PROCEDURE — 93010 ELECTROCARDIOGRAM REPORT: CPT | Performed by: INTERNAL MEDICINE

## 2025-06-18 PROCEDURE — 87077 CULTURE AEROBIC IDENTIFY: CPT

## 2025-06-18 PROCEDURE — 87086 URINE CULTURE/COLONY COUNT: CPT

## 2025-06-21 LAB
BACTERIA UR CULT: ABNORMAL
ORGANISM: ABNORMAL

## 2025-06-23 ENCOUNTER — TELEPHONE (OUTPATIENT)
Dept: UROLOGY | Age: 50
End: 2025-06-23

## 2025-06-23 DIAGNOSIS — R33.9 RETENTION OF URINE, UNSPECIFIED: Primary | ICD-10-CM

## 2025-06-23 DIAGNOSIS — N13.30 HYDRONEPHROSIS, UNSPECIFIED HYDRONEPHROSIS TYPE: ICD-10-CM

## 2025-06-23 DIAGNOSIS — R33.9 URINARY RETENTION: ICD-10-CM

## 2025-06-23 DIAGNOSIS — Z01.818 PRE-OP TESTING: ICD-10-CM

## 2025-06-23 RX ORDER — DOXYCYCLINE HYCLATE 100 MG
100 TABLET ORAL 2 TIMES DAILY
Qty: 14 TABLET | Refills: 0 | Status: SHIPPED | OUTPATIENT
Start: 2025-06-23 | End: 2025-06-30

## 2025-06-23 NOTE — PROGRESS NOTES
Message sent to Maureen in Urology office of pt having abn EKG 6/18 and PAT sending to anesthesia for review.  Maureen will have pt do pre op labs and sending urine specimen to AP to be reviewed.  Made aware of 4/16 8.8 A1C as well.

## 2025-06-23 NOTE — PROGRESS NOTES
PAT call attempted, patient unavailable, left message to please call us back at your earliest convenience; [phone]

## 2025-06-23 NOTE — PROGRESS NOTES
PAT Call Date:  6/23   Surgery Date: 7/1    Surgeon: Jamie   Surgery: greenlight    Any Isolation Precautions? No      Type of Isolation Precaution: Not Applicable   Is patient from a nursing home? No  Name of Nursing Home:   Any equipment assist needed for moving patient? No   Type of Equipment: Not Applicable  Patient last weight: 274 lb     Hard Copy on Chart  In EPIC Pending/Notes   Consent -   Within 30 days; signed, dated & timed by patient and physician     [x] On Arrival     [] Blood      [] DNR   H&P -   Within 30 days    [x] Physician To Do     Clearance -      []Medical     []Cardiac     [] Pulmonary    Orders -   Signed and Dated    6/3  [] Physician To Do   K+  glucose   Labs -   Within 3 months   ORDERED          6/18 4/16  [x] CBC    [x] BMP   [x] GFR   [] INR    [] PTT    [x] Urine + DOXY   [] Liver Enzymes    [] MRSA Nasal      Others:A1C 8.8     Radiology Studies -   Within 1 year      5/14 4/21  [] Chest X-Ray   [] MRI    [x] CT    [] Vascular   [x] US     Pulmonary -     [] VERNON   [] CPAP     Cardiac Workup -   Stress Test, Echo, Cath within 18 months  6/18  [x] EKG   -abn   [] Cath                 [] Stress Test                      [] Echo/HILARIA   [] CABG   [] Holter Monitor      [] Pacemaker/ICD        Brand:        Where does patient have checked:         Last check:         Rep Notified:

## 2025-06-23 NOTE — TELEPHONE ENCOUNTER
Please review urine culture on 6/18/2025. Surgery with Dr Ayala on 7/1/2025 for a Greenlight Thanks.

## 2025-06-24 ENCOUNTER — HOSPITAL ENCOUNTER (OUTPATIENT)
Age: 50
Discharge: HOME | End: 2025-06-24
Payer: COMMERCIAL

## 2025-06-24 DIAGNOSIS — N13.30 HYDRONEPHROSIS, UNSPECIFIED HYDRONEPHROSIS TYPE: ICD-10-CM

## 2025-06-24 DIAGNOSIS — Z01.818 PRE-OP TESTING: ICD-10-CM

## 2025-06-24 DIAGNOSIS — R33.9 RETENTION OF URINE, UNSPECIFIED: ICD-10-CM

## 2025-06-24 DIAGNOSIS — R33.9 URINARY RETENTION: ICD-10-CM

## 2025-06-24 LAB
ANION GAP SERPL CALC-SCNC: 18 MEQ/L (ref 8–16)
BUN SERPL-MCNC: 24 MG/DL (ref 8–23)
CALCIUM SERPL-MCNC: 10 MG/DL (ref 8.6–10)
CHLORIDE SERPL-SCNC: 99 MEQ/L (ref 98–111)
CO2 SERPL-SCNC: 25 MEQ/L (ref 22–29)
CREAT SERPL-MCNC: 1.2 MG/DL (ref 0.7–1.2)
GFR SERPL CREATININE-BSD FRML MDRD: 73 ML/MIN/1.73M2
GLUCOSE SERPL-MCNC: 198 MG/DL (ref 74–109)
POTASSIUM SERPL-SCNC: 3.7 MEQ/L (ref 3.5–5.2)
SODIUM SERPL-SCNC: 142 MEQ/L (ref 135–145)

## 2025-06-24 PROCEDURE — 85025 COMPLETE CBC W/AUTO DIFF WBC: CPT

## 2025-06-24 PROCEDURE — 80048 BASIC METABOLIC PNL TOTAL CA: CPT

## 2025-06-24 PROCEDURE — 36415 COLL VENOUS BLD VENIPUNCTURE: CPT

## 2025-06-25 LAB
BASOPHILS ABSOLUTE: 0.1 THOU/MM3 (ref 0–0.1)
BASOPHILS NFR BLD AUTO: 0.6 %
DEPRECATED RDW RBC AUTO: 44.3 FL (ref 35–45)
EOSINOPHIL NFR BLD AUTO: 3.2 %
EOSINOPHILS ABSOLUTE: 0.4 THOU/MM3 (ref 0–0.4)
ERYTHROCYTE [DISTWIDTH] IN BLOOD BY AUTOMATED COUNT: 14.1 % (ref 11.5–14.5)
HCT VFR BLD AUTO: 36.2 % (ref 42–52)
HGB BLD-MCNC: 12 GM/DL (ref 14–18)
IMM GRANULOCYTES # BLD AUTO: 0.07 THOU/MM3 (ref 0–0.07)
IMM GRANULOCYTES NFR BLD AUTO: 0.6 %
LYMPHOCYTES ABSOLUTE: 3.4 THOU/MM3 (ref 1–4.8)
LYMPHOCYTES NFR BLD AUTO: 30.3 %
MCH RBC QN AUTO: 29.3 PG (ref 26–33)
MCHC RBC AUTO-ENTMCNC: 33.1 GM/DL (ref 32.2–35.5)
MCV RBC AUTO: 88.3 FL (ref 80–94)
MONOCYTES ABSOLUTE: 1 THOU/MM3 (ref 0.4–1.3)
MONOCYTES NFR BLD AUTO: 9.1 %
NEUTROPHILS ABSOLUTE: 6.4 THOU/MM3 (ref 1.8–7.7)
NEUTROPHILS NFR BLD AUTO: 56.2 %
NRBC BLD AUTO-RTO: 0 /100 WBC
PLATELET # BLD AUTO: 280 THOU/MM3 (ref 130–400)
PMV BLD AUTO: 12.1 FL (ref 9.4–12.4)
RBC # BLD AUTO: 4.1 MILL/MM3 (ref 4.7–6.1)
WBC # BLD AUTO: 11.3 THOU/MM3 (ref 4.8–10.8)

## 2025-07-01 ENCOUNTER — HOSPITAL ENCOUNTER (OUTPATIENT)
Age: 50
Setting detail: OUTPATIENT SURGERY
Discharge: HOME | End: 2025-07-01
Attending: UROLOGY | Admitting: UROLOGY
Payer: COMMERCIAL

## 2025-07-01 ENCOUNTER — ANESTHESIA EVENT (OUTPATIENT)
Dept: OPERATING ROOM | Age: 50
End: 2025-07-01
Payer: COMMERCIAL

## 2025-07-01 ENCOUNTER — TELEPHONE (OUTPATIENT)
Dept: UROLOGY | Age: 50
End: 2025-07-01

## 2025-07-01 ENCOUNTER — ANESTHESIA (OUTPATIENT)
Dept: OPERATING ROOM | Age: 50
End: 2025-07-01
Payer: COMMERCIAL

## 2025-07-01 VITALS
TEMPERATURE: 97.3 F | OXYGEN SATURATION: 100 % | BODY MASS INDEX: 37.62 KG/M2 | SYSTOLIC BLOOD PRESSURE: 141 MMHG | HEART RATE: 75 BPM | RESPIRATION RATE: 15 BRPM | WEIGHT: 262.8 LBS | HEIGHT: 70 IN | DIASTOLIC BLOOD PRESSURE: 92 MMHG

## 2025-07-01 DIAGNOSIS — R33.9 RETENTION OF URINE, UNSPECIFIED: ICD-10-CM

## 2025-07-01 DIAGNOSIS — N13.30 HYDRONEPHROSIS: ICD-10-CM

## 2025-07-01 DIAGNOSIS — G89.18 POST-OP PAIN: Primary | ICD-10-CM

## 2025-07-01 LAB
GLUCOSE BLD STRIP.AUTO-MCNC: 226 MG/DL (ref 70–108)
POTASSIUM SERPL-SCNC: 3.5 MEQ/L (ref 3.5–5.2)

## 2025-07-01 PROCEDURE — 82365 CALCULUS SPECTROSCOPY: CPT

## 2025-07-01 PROCEDURE — 7100000000 HC PACU RECOVERY - FIRST 15 MIN: Performed by: UROLOGY

## 2025-07-01 PROCEDURE — 3600000013 HC SURGERY LEVEL 3 ADDTL 15MIN: Performed by: UROLOGY

## 2025-07-01 PROCEDURE — 6360000002 HC RX W HCPCS: Performed by: NURSE ANESTHETIST, CERTIFIED REGISTERED

## 2025-07-01 PROCEDURE — 6360000002 HC RX W HCPCS: Performed by: UROLOGY

## 2025-07-01 PROCEDURE — 7100000001 HC PACU RECOVERY - ADDTL 15 MIN: Performed by: UROLOGY

## 2025-07-01 PROCEDURE — 3600000003 HC SURGERY LEVEL 3 BASE: Performed by: UROLOGY

## 2025-07-01 PROCEDURE — 3700000001 HC ADD 15 MINUTES (ANESTHESIA): Performed by: UROLOGY

## 2025-07-01 PROCEDURE — 2580000003 HC RX 258: Performed by: UROLOGY

## 2025-07-01 PROCEDURE — 7100000011 HC PHASE II RECOVERY - ADDTL 15 MIN: Performed by: UROLOGY

## 2025-07-01 PROCEDURE — 2720000010 HC SURG SUPPLY STERILE: Performed by: UROLOGY

## 2025-07-01 PROCEDURE — 36415 COLL VENOUS BLD VENIPUNCTURE: CPT

## 2025-07-01 PROCEDURE — 82948 REAGENT STRIP/BLOOD GLUCOSE: CPT

## 2025-07-01 PROCEDURE — 3700000000 HC ANESTHESIA ATTENDED CARE: Performed by: UROLOGY

## 2025-07-01 PROCEDURE — 7100000010 HC PHASE II RECOVERY - FIRST 15 MIN: Performed by: UROLOGY

## 2025-07-01 PROCEDURE — 2709999900 HC NON-CHARGEABLE SUPPLY: Performed by: UROLOGY

## 2025-07-01 PROCEDURE — 84132 ASSAY OF SERUM POTASSIUM: CPT

## 2025-07-01 RX ORDER — ONDANSETRON 2 MG/ML
4 INJECTION INTRAMUSCULAR; INTRAVENOUS
Status: CANCELLED | OUTPATIENT
Start: 2025-07-01

## 2025-07-01 RX ORDER — OXYBUTYNIN CHLORIDE 5 MG/1
5 TABLET ORAL
Qty: 90 TABLET | Refills: 3 | Status: SHIPPED | OUTPATIENT
Start: 2025-07-01 | End: 2025-07-01

## 2025-07-01 RX ORDER — HYDROCODONE BITARTRATE AND ACETAMINOPHEN 5; 325 MG/1; MG/1
1 TABLET ORAL EVERY 8 HOURS PRN
Qty: 9 TABLET | Refills: 0 | Status: SHIPPED | OUTPATIENT
Start: 2025-07-01 | End: 2025-07-04

## 2025-07-01 RX ORDER — LABETALOL HYDROCHLORIDE 5 MG/ML
10 INJECTION, SOLUTION INTRAVENOUS
Status: CANCELLED | OUTPATIENT
Start: 2025-07-01

## 2025-07-01 RX ORDER — PHENAZOPYRIDINE HYDROCHLORIDE 200 MG/1
200 TABLET, FILM COATED ORAL 3 TIMES DAILY PRN
Qty: 9 TABLET | Refills: 0 | Status: SHIPPED | OUTPATIENT
Start: 2025-07-01 | End: 2025-07-04

## 2025-07-01 RX ORDER — DEXAMETHASONE SODIUM PHOSPHATE 4 MG/ML
INJECTION, SOLUTION INTRA-ARTICULAR; INTRALESIONAL; INTRAMUSCULAR; INTRAVENOUS; SOFT TISSUE
Status: DISCONTINUED | OUTPATIENT
Start: 2025-07-01 | End: 2025-07-01 | Stop reason: SDUPTHER

## 2025-07-01 RX ORDER — SODIUM CHLORIDE 0.9 % (FLUSH) 0.9 %
5-40 SYRINGE (ML) INJECTION EVERY 12 HOURS SCHEDULED
Status: DISCONTINUED | OUTPATIENT
Start: 2025-07-01 | End: 2025-07-01 | Stop reason: HOSPADM

## 2025-07-01 RX ORDER — HYDROCODONE BITARTRATE AND ACETAMINOPHEN 5; 325 MG/1; MG/1
1 TABLET ORAL EVERY 8 HOURS PRN
Qty: 9 TABLET | Refills: 0 | Status: SHIPPED | OUTPATIENT
Start: 2025-07-01 | End: 2025-07-01

## 2025-07-01 RX ORDER — PHENAZOPYRIDINE HYDROCHLORIDE 200 MG/1
200 TABLET, FILM COATED ORAL 3 TIMES DAILY PRN
Qty: 9 TABLET | Refills: 0 | Status: SHIPPED | OUTPATIENT
Start: 2025-07-01 | End: 2025-07-01

## 2025-07-01 RX ORDER — FENTANYL CITRATE 50 UG/ML
INJECTION, SOLUTION INTRAMUSCULAR; INTRAVENOUS
Status: DISCONTINUED | OUTPATIENT
Start: 2025-07-01 | End: 2025-07-01 | Stop reason: SDUPTHER

## 2025-07-01 RX ORDER — ONDANSETRON 2 MG/ML
INJECTION INTRAMUSCULAR; INTRAVENOUS
Status: DISCONTINUED | OUTPATIENT
Start: 2025-07-01 | End: 2025-07-01 | Stop reason: SDUPTHER

## 2025-07-01 RX ORDER — SODIUM CHLORIDE 9 MG/ML
INJECTION, SOLUTION INTRAVENOUS PRN
Status: CANCELLED | OUTPATIENT
Start: 2025-07-01

## 2025-07-01 RX ORDER — SODIUM CHLORIDE 0.9 % (FLUSH) 0.9 %
5-40 SYRINGE (ML) INJECTION PRN
Status: DISCONTINUED | OUTPATIENT
Start: 2025-07-01 | End: 2025-07-01 | Stop reason: HOSPADM

## 2025-07-01 RX ORDER — PHENYLEPHRINE HCL IN 0.9% NACL 1 MG/10 ML
SYRINGE (ML) INTRAVENOUS
Status: DISCONTINUED | OUTPATIENT
Start: 2025-07-01 | End: 2025-07-01 | Stop reason: SDUPTHER

## 2025-07-01 RX ORDER — DOXYCYCLINE HYCLATE 50 MG/1
50 CAPSULE ORAL 2 TIMES DAILY
COMMUNITY

## 2025-07-01 RX ORDER — PHENAZOPYRIDINE HYDROCHLORIDE 200 MG/1
200 TABLET, FILM COATED ORAL
Status: DISCONTINUED | OUTPATIENT
Start: 2025-07-01 | End: 2025-07-01 | Stop reason: HOSPADM

## 2025-07-01 RX ORDER — SODIUM CHLORIDE 0.9 % (FLUSH) 0.9 %
5-40 SYRINGE (ML) INJECTION EVERY 12 HOURS SCHEDULED
Status: CANCELLED | OUTPATIENT
Start: 2025-07-01

## 2025-07-01 RX ORDER — OXYBUTYNIN CHLORIDE 5 MG/1
5 TABLET ORAL 3 TIMES DAILY PRN
Qty: 30 TABLET | Refills: 0 | Status: SHIPPED | OUTPATIENT
Start: 2025-07-01

## 2025-07-01 RX ORDER — HYDRALAZINE HYDROCHLORIDE 20 MG/ML
10 INJECTION INTRAMUSCULAR; INTRAVENOUS
Status: CANCELLED | OUTPATIENT
Start: 2025-07-01

## 2025-07-01 RX ORDER — LIDOCAINE HYDROCHLORIDE 20 MG/ML
INJECTION, SOLUTION INTRAVENOUS
Status: DISCONTINUED | OUTPATIENT
Start: 2025-07-01 | End: 2025-07-01 | Stop reason: SDUPTHER

## 2025-07-01 RX ORDER — NALOXONE HYDROCHLORIDE 0.4 MG/ML
INJECTION, SOLUTION INTRAMUSCULAR; INTRAVENOUS; SUBCUTANEOUS PRN
Status: CANCELLED | OUTPATIENT
Start: 2025-07-01

## 2025-07-01 RX ORDER — MIDAZOLAM HYDROCHLORIDE 1 MG/ML
INJECTION, SOLUTION INTRAMUSCULAR; INTRAVENOUS
Status: DISCONTINUED | OUTPATIENT
Start: 2025-07-01 | End: 2025-07-01 | Stop reason: SDUPTHER

## 2025-07-01 RX ORDER — SODIUM CHLORIDE 9 MG/ML
INJECTION, SOLUTION INTRAVENOUS PRN
Status: DISCONTINUED | OUTPATIENT
Start: 2025-07-01 | End: 2025-07-01 | Stop reason: HOSPADM

## 2025-07-01 RX ORDER — SODIUM CHLORIDE 0.9 % (FLUSH) 0.9 %
5-40 SYRINGE (ML) INJECTION PRN
Status: CANCELLED | OUTPATIENT
Start: 2025-07-01

## 2025-07-01 RX ORDER — PROPOFOL 10 MG/ML
INJECTION, EMULSION INTRAVENOUS
Status: DISCONTINUED | OUTPATIENT
Start: 2025-07-01 | End: 2025-07-01 | Stop reason: SDUPTHER

## 2025-07-01 RX ORDER — OXYBUTYNIN CHLORIDE 5 MG/1
5 TABLET ORAL 3 TIMES DAILY PRN
Qty: 30 TABLET | Refills: 0 | Status: SHIPPED | OUTPATIENT
Start: 2025-07-01 | End: 2025-07-01 | Stop reason: CLARIF

## 2025-07-01 RX ORDER — OXYBUTYNIN CHLORIDE 5 MG/1
5 TABLET ORAL
Status: DISCONTINUED | OUTPATIENT
Start: 2025-07-01 | End: 2025-07-01 | Stop reason: HOSPADM

## 2025-07-01 RX ADMIN — FENTANYL CITRATE 50 MCG: 50 INJECTION, SOLUTION INTRAMUSCULAR; INTRAVENOUS at 10:06

## 2025-07-01 RX ADMIN — FENTANYL CITRATE 50 MCG: 50 INJECTION, SOLUTION INTRAMUSCULAR; INTRAVENOUS at 10:15

## 2025-07-01 RX ADMIN — SODIUM CHLORIDE: 0.9 INJECTION, SOLUTION INTRAVENOUS at 09:18

## 2025-07-01 RX ADMIN — ONDANSETRON 4 MG: 2 INJECTION, SOLUTION INTRAMUSCULAR; INTRAVENOUS at 10:05

## 2025-07-01 RX ADMIN — MIDAZOLAM 2 MG: 1 INJECTION INTRAMUSCULAR; INTRAVENOUS at 09:56

## 2025-07-01 RX ADMIN — FENTANYL CITRATE 100 MCG: 50 INJECTION, SOLUTION INTRAMUSCULAR; INTRAVENOUS at 10:52

## 2025-07-01 RX ADMIN — FENTANYL CITRATE 50 MCG: 50 INJECTION, SOLUTION INTRAMUSCULAR; INTRAVENOUS at 09:57

## 2025-07-01 RX ADMIN — Medication 100 MG: at 10:00

## 2025-07-01 RX ADMIN — CEFAZOLIN 3000 MG: 10 INJECTION, POWDER, FOR SOLUTION INTRAVENOUS at 09:52

## 2025-07-01 RX ADMIN — Medication 100 MCG: at 10:42

## 2025-07-01 RX ADMIN — Medication 100 MCG: at 10:40

## 2025-07-01 RX ADMIN — FENTANYL CITRATE 50 MCG: 50 INJECTION, SOLUTION INTRAMUSCULAR; INTRAVENOUS at 10:03

## 2025-07-01 RX ADMIN — PROPOFOL 300 MG: 10 INJECTION, EMULSION INTRAVENOUS at 10:00

## 2025-07-01 RX ADMIN — DEXAMETHASONE SODIUM PHOSPHATE 10 MG: 4 INJECTION, SOLUTION INTRAMUSCULAR; INTRAVENOUS at 10:06

## 2025-07-01 ASSESSMENT — PAIN - FUNCTIONAL ASSESSMENT: PAIN_FUNCTIONAL_ASSESSMENT: 0-10

## 2025-07-01 NOTE — DISCHARGE INSTRUCTIONS
Henson out tomorrow     Pt ok to discharge home in good condition  No heavy lifting, >10 lbs for today  Pt should avoid strenuous activity for today  Pt should walk moderately at home  Pt ok to shower   Pt may resume diet as tolerated  Pt should take Rx as directed  No driving while on narcotics  Please call attending physician or hospital  with questions  Call or Present to ED if fever (> 101F), intractable nausea vomiting or pain.  Rx in chart     Pt should follow up with Dr Ayala, call to confirm appointment   Follow up in 2 wk with renal US and BMP prior, our office will schedule

## 2025-07-01 NOTE — PROGRESS NOTES
Pt has met discharge criteria and states he is ready for discharge to home. IV removed, gauze and tape applied. Dressed in own clothes and personal belongings gathered. Discharge instructions (with opioid medication education information) given to pt and family; pt and family verbalized understanding of discharge instructions, prescriptions and follow up appointments. Pt transported to discharge lobby by [facility] staff.

## 2025-07-01 NOTE — PROGRESS NOTES
Patient discharge orders placed per recommendations of Dr. Duarte Ayala as written in the brief operative report.     PDMP reviewed  Allergies, renal function, and urine studies reviewed.         Findings:  Large bladder stones . Did pvp. Hx of hydronephrosis, high vol retention (likely NGB). Pt does not want to cic  Henson out tomorrow  Nelida 2 weeks with renal u/s and bmp

## 2025-07-01 NOTE — PROGRESS NOTES
1115- pt to pacu, oral airway presents, VSS, pt appears in no acute distress, lujan and CBI presents, 600mLs emptied from drainage bag, bag 1 of CBI with 1500mLs left and bag 2 is full  1125- oral airway remains, pt appears in no acute distress  1133- oral airway removed, resp easy and unlabored, VSS, pt denies pain and nausea at this time, pt appears in no acute distress  1135- urine in drainage bag is colorless without sediment, CBI turned off  1145- pt resting in bed with eyes opened, resp easy and unlabored, VSS, pt appears in no acute distress, pt denies pain and nausea  1150- pt meets criteria for discharge from pacu, pt transported to [facility] in stable condition

## 2025-07-01 NOTE — H&P
MISTY DURBIN MD  History and Physical    Patient:  Gerardo Whitman  MRN: [id number]  YOB: 1975    HISTORY OF PRESENT ILLNESS:     The patient is a 49 y.o. male who presents with bph. Here for procedure.    Patient's old records, notes and chart reviewed and summarized above.     MISTY DURBIN MD independently reviewed the images and verified the radiology reports from:    No results found.      Past Medical History:    Past Medical History:   Diagnosis Date    Hypertension        Past Surgical History:    Past Surgical History:   Procedure Laterality Date    APPENDECTOMY  06/14/2015    with ileocectomy       Medications Prior to Admission:    Prior to Admission medications    Medication Sig Start Date End Date Taking? Authorizing Provider   doxycycline (VIBRAMYCIN) 50 MG capsule Take 1 capsule by mouth 2 times daily   Yes Provider, MD Ed   losartan-hydroCHLOROthiazide (HYZAAR) 100-12.5 MG per tablet Take 1 tablet by mouth daily. 5/19/25  Yes Liana Goetz MD   metFORMIN (GLUCOPHAGE-XR) 500 MG extended release tablet Take 2 tablets by mouth daily (with breakfast) 4/16/25  Yes Liana Goetz MD   esomeprazole (NEXIUM) 20 MG delayed release capsule Take 1 capsule by mouth every morning (before breakfast) Gets at [facility] 5/10/24  Yes Liana Goetz MD   Semaglutide,0.25 or 0.5MG/DOS, (OZEMPIC, 0.25 OR 0.5 MG/DOSE,) 2 MG/3ML SOPN 0.25 mg subcutaneous weekly for 4 weeks, then 0.5 mg subcutaneous weekly for another 4 weeks.  Patient not taking: Reported on 7/1/2025 5/5/25   Liana Goetz MD   Lancets MISC 1 each by Does not apply route daily 12/24/24   Liana Goetz MD   blood glucose monitor strips Test 1 times a day & as needed for symptoms of irregular blood glucose. Dispense sufficient amount for indicated testing frequency plus additional to accommodate PRN testing needs. 1/29/24   Liana Goetz MD   glucose monitoring kit 1 kit by Does not apply route  Temp Temp src Pulse Resp SpO2 Height Weight   07/01/25 0842 (!) 157/100 97.4 °F (36.3 °C) Temporal 85 16 98 % 1.778 m (5' 10\") 119.2 kg (262 lb 12.8 oz)     Constitutional: Patient in no acute distress;   Neuro: alert and oriented to person place and time.    Psych: Mood and affect normal.  Skin: Normal  Lungs: Respiratory effort normal, CTA  Cardiovascular:  Normal peripheral pulses; no murmur. Normal rhythm  Abdomen: Soft, non-tender, non-distended with no CVA, flank pain, hepatosplenomegaly or hernia.  Kidneys normal.  Bladder non-tender and not distended.      LABS:   No results for input(s): \"WBC\", \"HGB\", \"HCT\", \"MCV\", \"PLT\" in the last 72 hours.  No results for input(s): \"NA\", \"K\", \"CL\", \"CO2\", \"PHOS\", \"BUN\", \"CREATININE\" in the last 72 hours.    Invalid input(s): \"CA\"  Lab Results   Component Value Date    PSA 0.75 03/22/2025         Urinalysis: No results for input(s): \"COLORU\", \"PHUR\", \"LABCAST\", \"WBCUA\", \"RBCUA\", \"MUCUS\", \"TRICHOMONAS\", \"YEAST\", \"BACTERIA\", \"CLARITYU\", \"SPECGRAV\", \"LEUKOCYTESUR\", \"UROBILINOGEN\", \"BILIRUBINUR\", \"BLOODU\" in the last 72 hours.    Invalid input(s): \"NITRATE\", \"GLUCOSEUKETONESUAMORPHOUS\"     -----------------------------------------------------------------      Assessment and Plan     Impression:    Patient Active Problem List   Diagnosis    Liver enzyme elevation    Primary hypertension    Gastroesophageal reflux disease without esophagitis    Low HDL (under 40)    Right foot pain    Plantar fasciitis of right foot    Uncontrolled type 2 diabetes mellitus with hyperglycemia (HCC)    Hematuria    Retention of urine, unspecified    Hydronephrosis       Plan:     Consent obtained; cysto greenlight in OR today    MISTY DURBIN MD  9:05 AM 7/1/2025

## 2025-07-01 NOTE — ANESTHESIA POSTPROCEDURE EVALUATION
Department of Anesthesiology  Postprocedure Note    Patient: Gerardo Whitman  MRN: [id number]  YOB: 1975  Date of evaluation: 7/1/2025    Procedure Summary       Date: 07/01/25 [facility]    Anesthesia Start: 0957 Anesthesia Stop:     Procedure: CYSTOSCOPY GREENLIGHT PHOTO VAPORIZATION OF PROSTATE, CYSTOLITHALOPAXY Diagnosis:       Retention of urine, unspecified      Hydronephrosis      (Retention of urine, unspecified [R33.9])      (Hydronephrosis [N13.30])    Surgeons: Duarte Ayala MD Responsible Provider: Damien Martinez MD    Anesthesia Type: general ASA Status: 3            Anesthesia Type: No value filed.    Eliezer Phase I: Eliezer Score: 10    Eliezer Phase II: Eliezer Score: 10    Anesthesia Post Evaluation    Patient location during evaluation: PACU  Patient participation: complete - patient participated  Level of consciousness: awake and alert  Airway patency: patent  Nausea & Vomiting: no nausea  Cardiovascular status: blood pressure returned to baseline and hemodynamically stable  Respiratory status: acceptable and spontaneous ventilation  Hydration status: euvolemic  Pain management: adequate    No notable events documented.

## 2025-07-01 NOTE — TELEPHONE ENCOUNTER
[facility] called and would like the oxybutynin resent. It does not have an frequency listed on it only prn

## 2025-07-01 NOTE — ANESTHESIA PRE PROCEDURE
Department of Anesthesiology  Preprocedure Note       Name:  Gerardo Whitman   Age:  49 y.o.  :  1975                                          MRN:  [id number]         Date:  2025      Surgeon: Surgeon(s):  Duarte Ayala MD    Procedure: Procedure(s):  CYSTOSCOPY GREENLIGHT PHOTO VAPORIZATION OF PROSTATE    Medications prior to admission:   Prior to Admission medications    Medication Sig Start Date End Date Taking? Authorizing Provider   doxycycline (VIBRAMYCIN) 50 MG capsule Take 1 capsule by mouth 2 times daily   Yes Ed Haque MD   losartan-hydroCHLOROthiazide (HYZAAR) 100-12.5 MG per tablet Take 1 tablet by mouth daily. 25  Yes Liana Goetz MD   metFORMIN (GLUCOPHAGE-XR) 500 MG extended release tablet Take 2 tablets by mouth daily (with breakfast) 25  Yes Liana Goetz MD   esomeprazole (NEXIUM) 20 MG delayed release capsule Take 1 capsule by mouth every morning (before breakfast) Gets at [facility] 5/10/24  Yes Liana Goetz MD   Semaglutide,0.25 or 0.5MG/DOS, (OZEMPIC, 0.25 OR 0.5 MG/DOSE,) 2 MG/3ML SOPN 0.25 mg subcutaneous weekly for 4 weeks, then 0.5 mg subcutaneous weekly for another 4 weeks.  Patient not taking: Reported on 2025   Liana Goetz MD   Lancets MISC 1 each by Does not apply route daily 24   Liana Goetz MD   blood glucose monitor strips Test 1 times a day & as needed for symptoms of irregular blood glucose. Dispense sufficient amount for indicated testing frequency plus additional to accommodate PRN testing needs. 24   Liana Goetz MD   glucose monitoring kit 1 kit by Does not apply route daily 24   Liana Goetz MD       Current medications:    Current Facility-Administered Medications   Medication Dose Route Frequency Provider Last Rate Last Admin   • sodium chloride flush 0.9 % injection 5-40 mL  5-40 mL IntraVENous 2 times per day Duarte Ayala MD       • sodium chloride flush 0.9 %

## 2025-07-01 NOTE — PROGRESS NOTES
Patient oriented to Same Day department and admitted to Same Day Surgery room 9.   Patient verbalized approval for first name, last initial with physician name on unit whiteboard.     Plan of care reviewed with patient.   Patient room whiteboard filled out and discussed with patient and responsible adult.   Patient and responsible adult offered Same Day Welcome Packet to review.    Call light in reach.   Bed in lowest position, locked, with one bed rail up.   SCDs and warming blanket in place.  Appropriate arm bands on patient.   Bathroom offered.   All questions and concerns of patient addressed.        Meds to Beds:   Patient informed of [facility] Meds to Beds program during admission. Patient has declined use of program.

## 2025-07-01 NOTE — BRIEF OP NOTE
Brief Postoperative Note      Patient: Gerardo Whitman  YOB: 1975  MRN: [id number]    Date of Procedure: 7/1/2025    Pre-Op Diagnosis Codes:      * Retention of urine, unspecified [R33.9]     * Hydronephrosis [N13.30]    Post-Op Diagnosis: Same       Procedure(s):  CYSTOSCOPY GREENLIGHT PHOTO VAPORIZATION OF PROSTATE, CYSTOLITHALOPAXY    Surgeon(s):  Duarte Durbin MD    Assistant:  * No surgical staff found *    Anesthesia: General    Estimated Blood Loss (mL): Minimal    Complications: None    Specimens:   ID Type Source Tests Collected by Time Destination   A : Bladder Stone Stone (Calculus) Bladder SURGICAL PATHOLOGY, STONE ANALYSIS Duarte Durbin MD 7/1/2025 1049        Implants:  * No implants in log *      Drains:   Urinary Catheter 07/01/25 3 Way (Active)   Catheter Indications Urinary retention (acute or chronic), continuous bladder irrigation or bladder outlet obstruction 07/01/25 1119   Site Assessment No urethral drainage 07/01/25 1119   Urine Color Colorless 07/01/25 1135   Urine Appearance Clear 07/01/25 1135   Collection Container Standard 07/01/25 1119   Catheter Best Practices  Drainage tube clipped to bed 07/01/25 1119   Status Draining;Continuous bladder irrigation 07/01/25 1119   CBI Henson Output (mL) 600 mL 07/01/25 1135       Findings:  Large bladder stones . Did pvp. Hx of hydronephrosis, high vol retention (likely NGB). Pt does not want to cic  Henson out tomorrow  Nelida 2 weeks with renal u/s and bmp    Electronically signed by DUARTE DURBIN MD on 7/1/2025 at 11:39 AM   PAULIE Hoover

## 2025-07-01 NOTE — PROGRESS NOTES
Pt returned to [facility] room 9. Vitals and assessment as charted. 0.9 infusing, to count from PACU. Pt has crackers and water. Family at the bedside. Pt and family verbalized understanding of discharge criteria and call light use. Call light in reach.

## 2025-07-02 ENCOUNTER — HOSPITAL ENCOUNTER (EMERGENCY)
Age: 50
Discharge: HOME | End: 2025-07-02
Attending: STUDENT IN AN ORGANIZED HEALTH CARE EDUCATION/TRAINING PROGRAM
Payer: COMMERCIAL

## 2025-07-02 ENCOUNTER — CLINICAL SUPPORT (OUTPATIENT)
Dept: UROLOGY | Age: 50
End: 2025-07-02

## 2025-07-02 VITALS
WEIGHT: 260 LBS | OXYGEN SATURATION: 99 % | DIASTOLIC BLOOD PRESSURE: 94 MMHG | HEART RATE: 78 BPM | TEMPERATURE: 98.6 F | RESPIRATION RATE: 18 BRPM | HEIGHT: 70 IN | SYSTOLIC BLOOD PRESSURE: 163 MMHG | BODY MASS INDEX: 37.22 KG/M2

## 2025-07-02 DIAGNOSIS — R33.9 RETENTION OF URINE, UNSPECIFIED: Primary | ICD-10-CM

## 2025-07-02 DIAGNOSIS — Z46.6 URINARY CATHETER INSERTION/ADJUSTMENT/REMOVAL: ICD-10-CM

## 2025-07-02 DIAGNOSIS — N13.30 HYDRONEPHROSIS, UNSPECIFIED HYDRONEPHROSIS TYPE: ICD-10-CM

## 2025-07-02 DIAGNOSIS — R33.8 ACUTE URINARY RETENTION: Primary | ICD-10-CM

## 2025-07-02 DIAGNOSIS — N13.30 HYDRONEPHROSIS, UNSPECIFIED HYDRONEPHROSIS TYPE: Primary | ICD-10-CM

## 2025-07-02 DIAGNOSIS — R33.9 RETENTION OF URINE, UNSPECIFIED: ICD-10-CM

## 2025-07-02 DIAGNOSIS — R33.9 URINARY RETENTION: ICD-10-CM

## 2025-07-02 LAB
BACTERIA URNS QL MICRO: ABNORMAL /HPF
BILIRUB UR QL STRIP.AUTO: NEGATIVE
CASTS #/AREA URNS LPF: ABNORMAL /LPF
CASTS 2: ABNORMAL /LPF
CHARACTER UR: ABNORMAL
COLOR, UA: ABNORMAL
CRYSTALS URNS MICRO: ABNORMAL
EPITHELIAL CELLS, UA: ABNORMAL /HPF
GLUCOSE UR QL STRIP.AUTO: >= 1000 MG/DL
HGB UR QL STRIP.AUTO: ABNORMAL
KETONES UR QL STRIP.AUTO: NEGATIVE
MISCELLANEOUS 2: ABNORMAL
NITRITE UR QL STRIP: NEGATIVE
PH UR STRIP.AUTO: 5.5 [PH] (ref 5–9)
PROT UR STRIP.AUTO-MCNC: 100 MG/DL
RBC URINE: > 200 /HPF
RENAL EPI CELLS #/AREA URNS HPF: ABNORMAL /[HPF]
SP GR UR REFRACT.AUTO: 1.02 (ref 1–1.03)
UROBILINOGEN, URINE: 0.2 EU/DL (ref 0–1)
WBC #/AREA URNS HPF: ABNORMAL /HPF
WBC #/AREA URNS HPF: ABNORMAL /[HPF]
YEAST LIKE FUNGI URNS QL MICRO: ABNORMAL

## 2025-07-02 PROCEDURE — 81001 URINALYSIS AUTO W/SCOPE: CPT

## 2025-07-02 PROCEDURE — 87086 URINE CULTURE/COLONY COUNT: CPT

## 2025-07-02 PROCEDURE — 6370000000 HC RX 637 (ALT 250 FOR IP): Performed by: STUDENT IN AN ORGANIZED HEALTH CARE EDUCATION/TRAINING PROGRAM

## 2025-07-02 PROCEDURE — 99283 EMERGENCY DEPT VISIT LOW MDM: CPT

## 2025-07-02 PROCEDURE — 51702 INSERT TEMP BLADDER CATH: CPT

## 2025-07-02 PROCEDURE — 51798 US URINE CAPACITY MEASURE: CPT

## 2025-07-02 RX ORDER — LIDOCAINE HYDROCHLORIDE 20 MG/ML
JELLY TOPICAL ONCE
Status: COMPLETED | OUTPATIENT
Start: 2025-07-02 | End: 2025-07-02

## 2025-07-02 RX ADMIN — LIDOCAINE HYDROCHLORIDE: 20 JELLY TOPICAL at 17:57

## 2025-07-02 ASSESSMENT — PAIN - FUNCTIONAL ASSESSMENT: PAIN_FUNCTIONAL_ASSESSMENT: NONE - DENIES PAIN

## 2025-07-02 ASSESSMENT — PAIN SCALES - GENERAL: PAINLEVEL_OUTOF10: 1

## 2025-07-02 NOTE — ED NOTES
Pt laying in bed playing on cell phone. A&Ox4. Pt states he is ready to go home. Updated on plan of care. Voiced no needs. Call light in reach.

## 2025-07-02 NOTE — PROGRESS NOTES
Patient has given me verbal consent to perform lujan removal  Yes    DIAGNOSIS/INDICATION:  Retention of urine, unspecified [R33.9]     * Hydronephrosis [N13.30]    Per Mika SOTO 15 cc of water deflated from lujan balloon. 22 Fr straight lujan removed without difficulty.    Foreskin reduced back down?  N/A      Pt will drink fluids and report to ER in 6-8 hours if patient unable to urinate.      F/u with provider as scheduled.      Patient was a post op Greenlight, cath removal today, patient was informed if he is unable to urinate and has to report to ER to have them notify Urology Services prior to re-placing the lujan due to recent Greenlight.

## 2025-07-02 NOTE — ED NOTES
Urinary cath placed at this time. Pt tolerated well. VSS. Urine specimen obtained and sent to the lab

## 2025-07-02 NOTE — ED TRIAGE NOTES
Pt presents to the ED with c/o decreased urinary output. Pt says he had a procedure done on his prostate yesterday and has not had any urinary output in about 8 hours. Pt reports to having an urge to void but only has blood. Pt denies any redness or irritation at the site. No fevers per pt.

## 2025-07-02 NOTE — DISCHARGE INSTR - COC
Continuity of Care Form    Patient Name: Gerardo Whitman   :  1975  MRN:  [id number]    Admit date:  2025  Discharge date:  ***    Code Status Order: Prior   Advance Directives:     Admitting Physician:  No admitting provider for patient encounter.  PCP: Liana Goetz MD    Discharging Nurse: ***  Discharging Hospital Unit/Room#: 14/014A  Discharging Unit Phone Number: ***    Emergency Contact:   Extended Emergency Contact Information  Primary Emergency Contact: Loly Whitman   [facility]  Mobile Phone: [phone]  Relation: Parent    Past Surgical History:  Past Surgical History:   Procedure Laterality Date    APPENDECTOMY  2015    with ileocectomy    TURP N/A 2025    CYSTOSCOPY GREENLIGHT PHOTO VAPORIZATION OF PROSTATE, CYSTOLITHALOPAXY performed by Duarte Ayala MD at [facility] OR       Immunization History:     There is no immunization history on file for this patient.    Active Problems:  Patient Active Problem List   Diagnosis Code    Liver enzyme elevation R74.8    Primary hypertension I10    Gastroesophageal reflux disease without esophagitis K21.9    Low HDL (under 40) E78.6    Right foot pain M79.671    Plantar fasciitis of right foot M72.2    Uncontrolled type 2 diabetes mellitus with hyperglycemia (HCC) E11.65    Hematuria R31.9    Retention of urine, unspecified R33.9    Hydronephrosis N13.30       Isolation/Infection:   Isolation            No Isolation          Patient Infection Status    No active infections.   Resolved       Infection Onset Added Last Indicated Last Indicated By Resolved Resolved By    COVID-19 10/12/21 10/14/21 10/12/21 COVID-19 10/26/21 Infection                          Nurse Assessment:  Last Vital Signs: BP (!) 163/94   Pulse 78   Temp 98.6 °F (37 °C) (Oral)   Resp 18   Ht 1.778 m (5' 10\")   Wt 117.9 kg (260 lb)   SpO2 99%   BMI 37.31 kg/m²     Last documented pain score (0-10 scale): Pain Level: 1  Last Weight:   Wt Readings from

## 2025-07-02 NOTE — DISCHARGE INSTRUCTIONS
You were seen today for acute urinary retention we placed a urinary catheter and sent a sample of this to the lab to see if he has an infection or not.    Call Dr. Ayala's office tomorrow to see if they want to see you sooner than Monday at 9a, otherwise follow-up Monday at 9am.    If symptoms are worse or other new concerns please come back to the Emergency Department for re-evaluation.

## 2025-07-02 NOTE — ED PROVIDER NOTES
1.778 m (5' 10\")           Physical Exam  Vitals and nursing note reviewed.   Constitutional:       General: He is not in acute distress.     Appearance: Normal appearance. He is normal weight. He is not ill-appearing, toxic-appearing or diaphoretic.   HENT:      Head: Normocephalic and atraumatic.      Nose: Nose normal.      Mouth/Throat:      Mouth: Mucous membranes are moist.      Pharynx: Oropharynx is clear.   Eyes:      Conjunctiva/sclera: Conjunctivae normal.   Cardiovascular:      Rate and Rhythm: Normal rate and regular rhythm.      Pulses: Normal pulses.      Heart sounds: Normal heart sounds.   Pulmonary:      Effort: Pulmonary effort is normal.      Breath sounds: Normal breath sounds.   Abdominal:      General: Abdomen is flat. Bowel sounds are normal.      Palpations: Abdomen is soft.      Tenderness: There is no abdominal tenderness.   Musculoskeletal:      Cervical back: Normal range of motion.   Skin:     General: Skin is warm and dry.      Capillary Refill: Capillary refill takes less than 2 seconds.   Neurological:      Mental Status: He is alert and oriented to person, place, and time.            FORMAL DIAGNOSTIC RESULTS   RADIOLOGY: Interpretation per the Radiologist below, if available at the time of this note (none if blank):  No orders to display       LABS: (none if blank)  Labs Reviewed   URINE WITH REFLEXED MICRO - Abnormal; Notable for the following components:       Result Value    Glucose, Ur >= 1000 (*)     Blood, Urine LARGE (*)     Protein,  (*)     Leukocyte Esterase, Urine TRACE (*)     Color, UA ORANGE (*)     Character, Urine CLOUDY (*)     All other components within normal limits   CULTURE, REFLEXED, URINE    Narrative:     Source: urine, clean catch       Site:           Current Antibiotics: not stated     (Any cultures that may have been sent were not resulted at the time of this patient visit. A negative COVID-19 test should be interpreted as COVID no longer

## 2025-07-04 LAB — BACTERIA UR CULT: NORMAL

## 2025-07-05 LAB — STONE ANALYSIS: NORMAL

## 2025-07-07 ENCOUNTER — OFFICE VISIT (OUTPATIENT)
Dept: UROLOGY | Age: 50
End: 2025-07-07

## 2025-07-07 VITALS — HEIGHT: 70 IN | BODY MASS INDEX: 36.92 KG/M2 | TEMPERATURE: 97.4 F | WEIGHT: 257.9 LBS

## 2025-07-07 DIAGNOSIS — N21.0 BLADDER STONES: ICD-10-CM

## 2025-07-07 DIAGNOSIS — N13.8 BENIGN PROSTATIC HYPERPLASIA WITH URINARY OBSTRUCTION: ICD-10-CM

## 2025-07-07 DIAGNOSIS — N40.1 BENIGN PROSTATIC HYPERPLASIA WITH URINARY OBSTRUCTION: ICD-10-CM

## 2025-07-07 DIAGNOSIS — R33.9 URINARY RETENTION: Primary | ICD-10-CM

## 2025-07-07 PROCEDURE — 99024 POSTOP FOLLOW-UP VISIT: CPT

## 2025-07-07 NOTE — PROGRESS NOTES
patient expresses understanding and will return to the office within 6 hours if unable to void.    *Pending successful VT, patient to follow-up as scheduled with Ivonne with [facility] prior.    Antoine Ybarra PA-C  Urology

## 2025-07-15 ENCOUNTER — HOSPITAL ENCOUNTER (OUTPATIENT)
Age: 50
Discharge: HOME | End: 2025-07-15
Payer: COMMERCIAL

## 2025-07-15 DIAGNOSIS — N13.30 HYDRONEPHROSIS, UNSPECIFIED HYDRONEPHROSIS TYPE: ICD-10-CM

## 2025-07-15 DIAGNOSIS — R33.9 RETENTION OF URINE, UNSPECIFIED: ICD-10-CM

## 2025-07-15 DIAGNOSIS — R33.9 URINARY RETENTION: ICD-10-CM

## 2025-07-15 LAB
ANION GAP SERPL CALC-SCNC: 14 MEQ/L (ref 8–16)
BUN SERPL-MCNC: 15 MG/DL (ref 8–23)
CALCIUM SERPL-MCNC: 10.1 MG/DL (ref 8.6–10)
CHLORIDE SERPL-SCNC: 102 MEQ/L (ref 98–111)
CO2 SERPL-SCNC: 26 MEQ/L (ref 22–29)
CREAT SERPL-MCNC: 0.9 MG/DL (ref 0.7–1.2)
GFR SERPL CREATININE-BSD FRML MDRD: > 90 ML/MIN/1.73M2
GLUCOSE SERPL-MCNC: 191 MG/DL (ref 74–109)
POTASSIUM SERPL-SCNC: 4 MEQ/L (ref 3.5–5.2)
SODIUM SERPL-SCNC: 142 MEQ/L (ref 135–145)

## 2025-07-15 PROCEDURE — 36415 COLL VENOUS BLD VENIPUNCTURE: CPT

## 2025-07-15 PROCEDURE — 80048 BASIC METABOLIC PNL TOTAL CA: CPT

## 2025-07-16 ENCOUNTER — HOSPITAL ENCOUNTER (OUTPATIENT)
Dept: ULTRASOUND IMAGING | Age: 50
Discharge: HOME | End: 2025-07-16
Attending: UROLOGY
Payer: COMMERCIAL

## 2025-07-16 DIAGNOSIS — N13.30 HYDRONEPHROSIS, UNSPECIFIED HYDRONEPHROSIS TYPE: ICD-10-CM

## 2025-07-16 DIAGNOSIS — R33.9 URINARY RETENTION: ICD-10-CM

## 2025-07-16 DIAGNOSIS — R33.9 RETENTION OF URINE, UNSPECIFIED: ICD-10-CM

## 2025-07-16 PROCEDURE — 76775 US EXAM ABDO BACK WALL LIM: CPT

## 2025-07-20 NOTE — OP NOTE
Duarte Ayala MD.  Urologic Surgery      [facility]    DATE: 7/1/2025  Patient:  Gerardo Whitman  MRN: [id number]  YOB: 1975    SURGEON: Duarte Ayala MD.    ASSISTANT: none    PREOPERATIVE DIAGNOSIS: BPH with outlet obstruction, bladder stones    POSTOPERATIVE DIAGNOSIS: BPH with outlet obstruction, bladder stones    PROCEDURE PERFORMED:  Cystoscopy, Greenlight Photovaporization of Prostate,   Cystolithalopaxy for stone > 3.5 cm    ANESTHESIA: General    COMPLICATIONS: none    OR BLOOD LOSS:  Minimal    FLUIDS: Cystalloids per Anesthesia    SPECIMENS:  ID Type Source Tests Collected by Time Destination   A : Bladder Stone Stone (Calculus) Bladder SURGICAL PATHOLOGY, STONE ANALYSIS Duarte Ayala MD 7/1/2025 1049          DRAINS: [address] lujan    INDICATIONS FOR PROCEDURE:  The patient is a 49 y.o. male who presents today with Retention of urine, unspecified [R33.9]  Hydronephrosis [N13.30] here for CYSTOSCOPY GREENLIGHT PHOTO VAPORIZATION OF PROSTATE, CYSTOLITHALOPAXY. After risks, benefits and alternatives of the procedure were discussed with the patient, the patient elected to proceed.     DETAILS OF PROCEDURE:  After informed consent was obtained in the preoperative area, the patient was taken back to the operating room and transferred to the operating table in supine position.  EPC cuffs were placed. The machine was turned on.  Anesthesia was induced and antibiotics were given.  The patient was placed in modified dorsal lithotomy position and sterilely prepped and draped in a standard fashion.  A timeout occurred.  Two patient identifiers were used.  The 25F rigid scope with the visual obturator was carefully advanced through the urethra.  .  Once within the bladder the visual obturator was exchanged for the resection bridge.        We then removed the bladder calculi. the bladder stones were identified, which was approximately 3.5 cm in total aggregate.   we used the holmium

## 2025-07-23 NOTE — PROGRESS NOTES
[facility] [place] [facility]  [facility] UROLOGY  [address]  [address]  [facility] [address]  Dept: [phone]  Loc: [phone]    HPI:     Gerardo Whitman is a 49 y.o. male who presents today for:  Chief Complaint   Patient presents with    Post-Op Check       HPI  Patient presents to urology clinic for post-operative follow-up.     Gerardo is doing well. S/P Cystoscopy, Greenlight Photovaporization of the Prostate, Cystolitholapaxy on 07/01/25 by Dr. Ayala. No bothersome LUTS. IPSS 1/0      History of high volume urinary retention. NGB suspected. Failed initial post-op void trial. 1.5 L of output with lujan catheter placement in the ED on 07/02/25.      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    Current Outpatient Medications   Medication Sig Dispense Refill    doxycycline (VIBRAMYCIN) 50 MG capsule Take 1 capsule by mouth 2 times daily      losartan-hydroCHLOROthiazide (HYZAAR) 100-12.5 MG per tablet Take 1 tablet by mouth daily. 90 tablet 1    Semaglutide,0.25 or 0.5MG/DOS, (OZEMPIC, 0.25 OR 0.5 MG/DOSE,) 2 MG/3ML SOPN 0.25 mg subcutaneous weekly for 4 weeks, then 0.5 mg subcutaneous weekly for another 4 weeks. 3 mL 1    metFORMIN (GLUCOPHAGE-XR) 500 MG extended release tablet Take 2 tablets by mouth daily (with breakfast) 180 tablet 5    Lancets MISC 1 each by Does not apply route daily 300 each 1    esomeprazole (NEXIUM) 20 MG delayed release capsule Take 1 capsule by mouth every morning (before breakfast) Gets at [employer] 90 capsule 3    blood glucose monitor strips Test 1 times a day & as needed for symptoms of irregular blood glucose. Dispense sufficient amount for indicated testing frequency plus additional to accommodate PRN testing needs. 100 strip 3    glucose monitoring kit 1 kit by Does not apply route daily 1 kit 0     No current facility-administered medications for this visit.       Past Medical History  Gerardo  has a

## 2025-07-24 ENCOUNTER — OFFICE VISIT (OUTPATIENT)
Dept: UROLOGY | Age: 50
End: 2025-07-24

## 2025-07-24 VITALS — WEIGHT: 250 LBS | BODY MASS INDEX: 35.79 KG/M2 | HEIGHT: 70 IN | RESPIRATION RATE: 18 BRPM

## 2025-07-24 DIAGNOSIS — N13.8 BENIGN PROSTATIC HYPERPLASIA WITH URINARY OBSTRUCTION: Primary | ICD-10-CM

## 2025-07-24 DIAGNOSIS — R33.9 URINARY RETENTION: ICD-10-CM

## 2025-07-24 DIAGNOSIS — N20.0 KIDNEY STONE: ICD-10-CM

## 2025-07-24 DIAGNOSIS — N40.1 BENIGN PROSTATIC HYPERPLASIA WITH URINARY OBSTRUCTION: Primary | ICD-10-CM

## 2025-07-24 DIAGNOSIS — N21.0 BLADDER STONES: ICD-10-CM

## 2025-07-24 PROCEDURE — 99024 POSTOP FOLLOW-UP VISIT: CPT | Performed by: NURSE PRACTITIONER

## (undated) DEVICE — CYSTO: Brand: MEDLINE INDUSTRIES, INC.

## (undated) DEVICE — FIBER LASER MOXY 532NM WAVELENGTH LIQUID COOLED SINGLE-USE F/GREENLIGHT XPS SYSTEM

## (undated) DEVICE — 1000ML,PRESSURE INFUSER W/STOPCOCK: Brand: MEDLINE

## (undated) DEVICE — BLADDER EVACUATOR: Brand: UROVAC BLADDER EVACUATOR

## (undated) DEVICE — SOLUTION IRRIG 1000ML STRL H2O USP PLAS POUR BTL

## (undated) DEVICE — DRAINBAG,ANTI-REFLUX TOWER,L/F,2000ML,LL: Brand: MEDLINE

## (undated) DEVICE — FIBER LASER SURG FIBER 1000 MH RND TIP HOLM SMARTSCOPE DISP

## (undated) DEVICE — SOL IRR SOD CHL 0.9% TITAN XL CNTNR 3000ML

## (undated) DEVICE — CATHETER URETH 22FR 30CC BLLN F 3 W SPEC M RND TIP TWO